# Patient Record
Sex: MALE | Race: WHITE | NOT HISPANIC OR LATINO | Employment: OTHER | URBAN - METROPOLITAN AREA
[De-identification: names, ages, dates, MRNs, and addresses within clinical notes are randomized per-mention and may not be internally consistent; named-entity substitution may affect disease eponyms.]

---

## 2022-03-09 ENCOUNTER — APPOINTMENT (OUTPATIENT)
Dept: CT IMAGING | Facility: HOSPITAL | Age: 62
End: 2022-03-09

## 2022-03-09 ENCOUNTER — HOSPITAL ENCOUNTER (INPATIENT)
Facility: HOSPITAL | Age: 62
LOS: 2 days | Discharge: HOME | End: 2022-03-11
Attending: EMERGENCY MEDICINE | Admitting: INTERNAL MEDICINE

## 2022-03-09 DIAGNOSIS — R19.00 ABDOMINAL MASS, UNSPECIFIED ABDOMINAL LOCATION: Primary | ICD-10-CM

## 2022-03-09 DIAGNOSIS — R26.2 DIFFICULTY WALKING: ICD-10-CM

## 2022-03-09 DIAGNOSIS — R19.09 ABDOMINAL MASS OF OTHER SITE: ICD-10-CM

## 2022-03-09 DIAGNOSIS — Z78.9 DECREASED ACTIVITIES OF DAILY LIVING (ADL): ICD-10-CM

## 2022-03-09 LAB
ALBUMIN SERPL-MCNC: 3.4 G/DL (ref 3.5–5.2)
ALBUMIN/GLOB SERPL: 1 G/DL
ALP SERPL-CCNC: 126 U/L (ref 39–117)
ALT SERPL W P-5'-P-CCNC: 23 U/L (ref 1–41)
AMMONIA BLD-SCNC: 15 UMOL/L (ref 16–60)
ANION GAP SERPL CALCULATED.3IONS-SCNC: 13.7 MMOL/L (ref 5–15)
AST SERPL-CCNC: 19 U/L (ref 1–40)
BASOPHILS # BLD AUTO: 0.02 10*3/MM3 (ref 0–0.2)
BASOPHILS NFR BLD AUTO: 0.1 % (ref 0–1.5)
BILIRUB SERPL-MCNC: 0.4 MG/DL (ref 0–1.2)
BILIRUB UR QL STRIP: NEGATIVE
BUN SERPL-MCNC: 26 MG/DL (ref 8–23)
BUN/CREAT SERPL: 26 (ref 7–25)
CALCIUM SPEC-SCNC: 9.8 MG/DL (ref 8.6–10.5)
CHLORIDE SERPL-SCNC: 91 MMOL/L (ref 98–107)
CLARITY UR: CLEAR
CO2 SERPL-SCNC: 28.3 MMOL/L (ref 22–29)
COLOR UR: YELLOW
CREAT SERPL-MCNC: 1 MG/DL (ref 0.76–1.27)
DEPRECATED RDW RBC AUTO: 45.1 FL (ref 37–54)
EGFRCR SERPLBLD CKD-EPI 2021: 85.1 ML/MIN/1.73
EOSINOPHIL # BLD AUTO: 0.01 10*3/MM3 (ref 0–0.4)
EOSINOPHIL NFR BLD AUTO: 0.1 % (ref 0.3–6.2)
ERYTHROCYTE [DISTWIDTH] IN BLOOD BY AUTOMATED COUNT: 14.6 % (ref 12.3–15.4)
GLOBULIN UR ELPH-MCNC: 3.5 GM/DL
GLUCOSE SERPL-MCNC: 161 MG/DL (ref 65–99)
GLUCOSE UR STRIP-MCNC: NEGATIVE MG/DL
HCT VFR BLD AUTO: 38.1 % (ref 37.5–51)
HGB BLD-MCNC: 12.6 G/DL (ref 13–17.7)
HGB UR QL STRIP.AUTO: NEGATIVE
HOLD SPECIMEN: NORMAL
HOLD SPECIMEN: NORMAL
IMM GRANULOCYTES # BLD AUTO: 0.08 10*3/MM3 (ref 0–0.05)
IMM GRANULOCYTES NFR BLD AUTO: 0.5 % (ref 0–0.5)
INR PPP: 1.01 (ref 2–3)
KETONES UR QL STRIP: NEGATIVE
LEUKOCYTE ESTERASE UR QL STRIP.AUTO: NEGATIVE
LIPASE SERPL-CCNC: 47 U/L (ref 13–60)
LYMPHOCYTES # BLD AUTO: 1.64 10*3/MM3 (ref 0.7–3.1)
LYMPHOCYTES NFR BLD AUTO: 9.3 % (ref 19.6–45.3)
MCH RBC QN AUTO: 28.7 PG (ref 26.6–33)
MCHC RBC AUTO-ENTMCNC: 33.1 G/DL (ref 31.5–35.7)
MCV RBC AUTO: 86.8 FL (ref 79–97)
MONOCYTES # BLD AUTO: 1.85 10*3/MM3 (ref 0.1–0.9)
MONOCYTES NFR BLD AUTO: 10.4 % (ref 5–12)
NEUTROPHILS NFR BLD AUTO: 14.12 10*3/MM3 (ref 1.7–7)
NEUTROPHILS NFR BLD AUTO: 79.6 % (ref 42.7–76)
NITRITE UR QL STRIP: NEGATIVE
NRBC BLD AUTO-RTO: 0 /100 WBC (ref 0–0.2)
PH UR STRIP.AUTO: 6 [PH] (ref 5–8)
PLATELET # BLD AUTO: 419 10*3/MM3 (ref 140–450)
PMV BLD AUTO: 9.8 FL (ref 6–12)
POTASSIUM SERPL-SCNC: 4.5 MMOL/L (ref 3.5–5.2)
PROT SERPL-MCNC: 6.9 G/DL (ref 6–8.5)
PROT UR QL STRIP: ABNORMAL
PROTHROMBIN TIME: 10.6 SECONDS (ref 9.4–12)
RBC # BLD AUTO: 4.39 10*6/MM3 (ref 4.14–5.8)
SODIUM SERPL-SCNC: 133 MMOL/L (ref 136–145)
SP GR UR STRIP: >1.03 (ref 1–1.03)
UROBILINOGEN UR QL STRIP: ABNORMAL
WBC NRBC COR # BLD: 17.72 10*3/MM3 (ref 3.4–10.8)
WHOLE BLOOD HOLD SPECIMEN: NORMAL

## 2022-03-09 PROCEDURE — 99284 EMERGENCY DEPT VISIT MOD MDM: CPT

## 2022-03-09 PROCEDURE — 82140 ASSAY OF AMMONIA: CPT | Performed by: INTERNAL MEDICINE

## 2022-03-09 PROCEDURE — 74177 CT ABD & PELVIS W/CONTRAST: CPT

## 2022-03-09 PROCEDURE — 85610 PROTHROMBIN TIME: CPT | Performed by: INTERNAL MEDICINE

## 2022-03-09 PROCEDURE — 80053 COMPREHEN METABOLIC PANEL: CPT | Performed by: EMERGENCY MEDICINE

## 2022-03-09 PROCEDURE — 25010000002 HYDROMORPHONE 1 MG/ML SOLUTION: Performed by: EMERGENCY MEDICINE

## 2022-03-09 PROCEDURE — 25010000002 HYDROMORPHONE 1 MG/ML SOLUTION: Performed by: INTERNAL MEDICINE

## 2022-03-09 PROCEDURE — 85025 COMPLETE CBC W/AUTO DIFF WBC: CPT | Performed by: EMERGENCY MEDICINE

## 2022-03-09 PROCEDURE — 83690 ASSAY OF LIPASE: CPT | Performed by: EMERGENCY MEDICINE

## 2022-03-09 PROCEDURE — 0 IOPAMIDOL PER 1 ML: Performed by: EMERGENCY MEDICINE

## 2022-03-09 PROCEDURE — 99223 1ST HOSP IP/OBS HIGH 75: CPT | Performed by: INTERNAL MEDICINE

## 2022-03-09 PROCEDURE — 81003 URINALYSIS AUTO W/O SCOPE: CPT | Performed by: EMERGENCY MEDICINE

## 2022-03-09 PROCEDURE — 82378 CARCINOEMBRYONIC ANTIGEN: CPT | Performed by: INTERNAL MEDICINE

## 2022-03-09 RX ORDER — ACETAMINOPHEN 500 MG
500-1000 TABLET ORAL EVERY 6 HOURS PRN
COMMUNITY
End: 2022-03-11 | Stop reason: HOSPADM

## 2022-03-09 RX ORDER — AMLODIPINE BESYLATE 10 MG/1
10 TABLET ORAL DAILY
COMMUNITY

## 2022-03-09 RX ORDER — PREGABALIN 150 MG/1
150 CAPSULE ORAL 2 TIMES DAILY
COMMUNITY

## 2022-03-09 RX ORDER — SODIUM CHLORIDE 0.9 % (FLUSH) 0.9 %
10 SYRINGE (ML) INJECTION AS NEEDED
Status: DISCONTINUED | OUTPATIENT
Start: 2022-03-09 | End: 2022-03-11 | Stop reason: HOSPADM

## 2022-03-09 RX ORDER — ATORVASTATIN CALCIUM 10 MG/1
10 TABLET, FILM COATED ORAL DAILY
COMMUNITY

## 2022-03-09 RX ORDER — PREDNISONE 20 MG/1
40 TABLET ORAL DAILY
COMMUNITY
Start: 2022-03-08 | End: 2022-03-11 | Stop reason: HOSPADM

## 2022-03-09 RX ORDER — LISINOPRIL 10 MG/1
10 TABLET ORAL DAILY
COMMUNITY
End: 2022-03-11 | Stop reason: HOSPADM

## 2022-03-09 RX ORDER — CEFUROXIME AXETIL 500 MG/1
500 TABLET ORAL 2 TIMES DAILY
COMMUNITY
Start: 2022-03-01 | End: 2022-03-11 | Stop reason: HOSPADM

## 2022-03-09 RX ADMIN — HYDROMORPHONE HYDROCHLORIDE 0.5 MG: 1 INJECTION, SOLUTION INTRAMUSCULAR; INTRAVENOUS; SUBCUTANEOUS at 12:50

## 2022-03-09 RX ADMIN — HYDROMORPHONE HYDROCHLORIDE 1 MG: 1 INJECTION, SOLUTION INTRAMUSCULAR; INTRAVENOUS; SUBCUTANEOUS at 10:30

## 2022-03-09 RX ADMIN — IOPAMIDOL 100 ML: 755 INJECTION, SOLUTION INTRAVENOUS at 10:11

## 2022-03-09 RX ADMIN — HYDROMORPHONE HYDROCHLORIDE 0.5 MG: 1 INJECTION, SOLUTION INTRAMUSCULAR; INTRAVENOUS; SUBCUTANEOUS at 22:23

## 2022-03-09 NOTE — ED PROVIDER NOTES
Time: 9:20 AM EST  Arrived by: ambulance  Chief Complaint: abdominal pain   History provided by: pt  History is limited by: N/A     History of Present Illness:    Ramesh Mccann is a 61 y.o. male who presents to the emergency department today with complaints of abdominal pain over the past four weeks. Pt states the pain is upper and shoot across his abdomen. He also goes on to complain of abdominal distention over the past few weeks as well as constipation. He notes he hasn't had a bowel movement in approximately a week. He denies fever, nausea, emesis, chest pain, dysuria, hematuria, or any other pertinent sx.      History provided by:  Patient   used: No    Abdominal Pain  Pain location:  RUQ  Pain radiates to:  Does not radiate  Pain severity:  Moderate  Onset quality:  Gradual  Duration:  4 weeks  Chronicity:  New  Relieved by:  Nothing  Associated symptoms: constipation    Associated symptoms: no chest pain, no chills, no cough, no diarrhea, no dysuria, no fever, no nausea, no shortness of breath, no sore throat and no vomiting    Constipation  Associated symptoms: abdominal pain    Associated symptoms: no diarrhea, no dysuria, no fever, no nausea and no vomiting        Patient Care Team  Primary Care Provider: Provider, No Known    Past Medical History:     Allergies   Allergen Reactions   • Penicillins Hives     Past Medical History:   Diagnosis Date   • COPD (chronic obstructive pulmonary disease) (HCC)    • Hypertension    • Stroke (HCC)      History reviewed. No pertinent surgical history.  History reviewed. No pertinent family history.    Home Medications:  Prior to Admission medications    Not on File        Social History:   Social History     Tobacco Use   • Smoking status: Current Every Day Smoker     Packs/day: 1.00     Types: Cigarettes   Substance Use Topics   • Alcohol use: Not Currently   • Drug use: Never     Recent travel: no     Review of Systems:  Review of Systems  "  Constitutional: Negative for chills and fever.   HENT: Negative for congestion, rhinorrhea and sore throat.    Eyes: Negative for pain and visual disturbance.   Respiratory: Negative for apnea, cough, chest tightness and shortness of breath.    Cardiovascular: Negative for chest pain and palpitations.   Gastrointestinal: Positive for abdominal distention, abdominal pain and constipation. Negative for diarrhea, nausea and vomiting.   Genitourinary: Negative for difficulty urinating and dysuria.   Musculoskeletal: Negative for joint swelling and myalgias.   Skin: Negative for color change.   Neurological: Negative for seizures and headaches.   Psychiatric/Behavioral: Negative.    All other systems reviewed and are negative.       Physical Exam:  /69   Pulse 87   Temp 97.9 °F (36.6 °C) (Oral)   Resp 20   Ht 182.9 cm (72\")   Wt 67.4 kg (148 lb 9.4 oz)   SpO2 92%   BMI 20.15 kg/m²     Physical Exam  Vitals and nursing note reviewed.   Constitutional:       General: He is not in acute distress.     Appearance: Normal appearance.   HENT:      Head: Normocephalic and atraumatic.      Nose: Nose normal.      Mouth/Throat:      Pharynx: Oropharynx is clear.   Eyes:      General: No scleral icterus.     Conjunctiva/sclera: Conjunctivae normal.   Cardiovascular:      Rate and Rhythm: Normal rate and regular rhythm.      Heart sounds: Normal heart sounds. No murmur heard.  Pulmonary:      Effort: No respiratory distress.      Breath sounds: Normal breath sounds. No wheezing, rhonchi or rales.   Chest:      Chest wall: No tenderness.   Abdominal:      General: There is distension.      Palpations: Abdomen is soft.      Tenderness: There is no abdominal tenderness. There is no guarding or rebound.      Comments: No rigidity.   Musculoskeletal:         General: No tenderness. Normal range of motion.      Cervical back: Normal range of motion and neck supple.      Right lower leg: No edema.      Left lower leg: No " edema.   Skin:     General: Skin is warm and dry.   Neurological:      Mental Status: He is alert. Mental status is at baseline.   Psychiatric:         Mood and Affect: Mood normal.         Behavior: Behavior normal.                Medications in the Emergency Department:  Medications   sodium chloride 0.9 % flush 10 mL (has no administration in time range)   HYDROmorphone (DILAUDID) injection 0.5 mg (0.5 mg Intravenous Given 3/9/22 1250)   HYDROmorphone (DILAUDID) injection 1 mg (1 mg Intravenous Given 3/9/22 1030)   iopamidol (ISOVUE-370) 76 % injection 100 mL (100 mL Intravenous Given 3/9/22 1011)        Labs  Lab Results (last 24 hours)     Procedure Component Value Units Date/Time    CBC & Differential [[id number]]  (Abnormal) Collected: 03/09/22 0917    Specimen: Blood Updated: 03/09/22 0933    Narrative:      The following orders were created for panel order CBC & Differential.  Procedure                               Abnormality         Status                     ---------                               -----------         ------                     CBC Auto Differential[[id number]]        Abnormal            Final result                 Please view results for these tests on the individual orders.    Comprehensive Metabolic Panel [[id number]]  (Abnormal) Collected: 03/09/22 0917    Specimen: Blood Updated: 03/09/22 0949     Glucose 161 mg/dL      BUN 26 mg/dL      Creatinine 1.00 mg/dL      Sodium 133 mmol/L      Potassium 4.5 mmol/L      Chloride 91 mmol/L      CO2 28.3 mmol/L      Calcium 9.8 mg/dL      Total Protein 6.9 g/dL      Albumin 3.40 g/dL      ALT (SGPT) 23 U/L      AST (SGOT) 19 U/L      Alkaline Phosphatase 126 U/L      Total Bilirubin 0.4 mg/dL      Globulin 3.5 gm/dL      A/G Ratio 1.0 g/dL      BUN/Creatinine Ratio 26.0     Anion Gap 13.7 mmol/L      eGFR 85.1 mL/min/1.73      Comment: National Kidney Foundation and American Society of Nephrology (ASN) Task Force recommended calculation based  on the Chronic Kidney Disease Epidemiology Collaboration (CKD-EPI) equation refit without adjustment for race.       Narrative:      GFR Normal >60  Chronic Kidney Disease <60  Kidney Failure <15      Lipase [[id number]]  (Normal) Collected: 03/09/22 0917    Specimen: Blood Updated: 03/09/22 0949     Lipase 47 U/L     CBC Auto Differential [[id number]]  (Abnormal) Collected: 03/09/22 0917    Specimen: Blood Updated: 03/09/22 0933     WBC 17.72 10*3/mm3      RBC 4.39 10*6/mm3      Hemoglobin 12.6 g/dL      Hematocrit 38.1 %      MCV 86.8 fL      MCH 28.7 pg      MCHC 33.1 g/dL      RDW 14.6 %      RDW-SD 45.1 fl      MPV 9.8 fL      Platelets 419 10*3/mm3      Neutrophil % 79.6 %      Lymphocyte % 9.3 %      Monocyte % 10.4 %      Eosinophil % 0.1 %      Basophil % 0.1 %      Immature Grans % 0.5 %      Neutrophils, Absolute 14.12 10*3/mm3      Lymphocytes, Absolute 1.64 10*3/mm3      Monocytes, Absolute 1.85 10*3/mm3      Eosinophils, Absolute 0.01 10*3/mm3      Basophils, Absolute 0.02 10*3/mm3      Immature Grans, Absolute 0.08 10*3/mm3      nRBC 0.0 /100 WBC     Urinalysis With Microscopic If Indicated (No Culture) - Urine, Clean Catch [[id number]]  (Abnormal) Collected: 03/09/22 1131    Specimen: Urine, Clean Catch Updated: 03/09/22 1140     Color, UA Yellow     Appearance, UA Clear     pH, UA 6.0     Specific Gravity, UA >1.030     Glucose, UA Negative     Ketones, UA Negative     Bilirubin, UA Negative     Blood, UA Negative     Protein, UA Trace     Leuk Esterase, UA Negative     Nitrite, UA Negative     Urobilinogen, UA 0.2 E.U./dL    Narrative:      Urine microscopic not indicated.           Imaging:  CT Abdomen Pelvis With Contrast    Result Date: 3/9/2022  PROCEDURE: CT ABDOMEN PELVIS W CONTRAST  COMPARISON: None  INDICATIONS: GENERALZIED ABDOMEN PAIN WITH SWELLING  TECHNIQUE: CT images were created with non-ionic intravenous contrast material.   PROTOCOL:   Standard imaging protocol performed    RADIATION:    DLP: 813.4mGy*cm   Automated exposure control was utilized to minimize radiation dose. CONTRAST: 100cc Isovue 370 I.V.  FINDINGS:  Sub cm nodule is seen in the posterior right middle lobe on the very 1st image of this study.  This is not adequately characterized.  CT scan of the chest is recommended.  Moderate hiatal hernia is evident.  The liver has a nodular contour suggesting cirrhosis.  Multiple hypodense hepatic masses are suspicious for metastatic disease.  Irregular peripheral enhancement is evident.  The nodular peripheral progressive enhancement typical of hemangioma is not evident.  The largest lesion is seen in the left liver lobe and measures 5 cm in diameter.  About 10 percent of the hepatic volume is involved.  The gallbladder is not abnormally distended.  Multi-cystic mass in the head of the pancreas measures 5 cm by 2.5 cm.  A moderate amount of ascites is evident.  The spleen is of normal size.  The left kidney has a normal appearance.  The right kidney is relatively small, suggesting chronic ischemia.  Moderately dilated loops of small bowel measure up to 3 cm in diameter, with prominent air-fluid levels.  Heterogeneous mass in the right lower quadrant involve small bowel, and measures 10 cm in diameter.  CONTINUED ON NEXT PAGE...    No abnormality of the colon is evident.  The urinary bladder is not abnormally distended.  Multiple old right rib fractures are evident.  Moderate degenerative changes seen in the lower thoracic and lumbar spine.        CT scan of the abdomen and pelvis with IV contrast demonstrating nodular hepatic contour suggesting cirrhosis.  Multiple hypodense hepatic masses with a regular peripheral enhancement are consistent with metastases.  5 cm multi cystic mass is seen in the head of the pancreas.  Moderately dilated loops of small bowel with prominent air-fluid levels may represent partial or incomplete small bowel obstruction.  10 cm heterogeneous mass in the right lower  quadrant involves small bowel.     NAVEEN MOHAN MD       Electronically Signed and Approved By: NAVEEN MOHAN MD on 3/09/2022 at 11:08               Procedures:  Procedures    Progress                            Medical Decision Making:  MDM  Number of Diagnoses or Management Options  Abdominal mass, unspecified abdominal location  Diagnosis management comments: White blood cell count of 17,000.  The patient´s CMP was reviewed and shows no abnormalities of critical concern. Of note, the patient´s sodium and potassium are acceptable. The patient´s liver enzymes are unremarkable. The patient´s renal function (creatinine) is preserved. The patient has a normal anion gap.  Urinalysis is negative for bacteriuria.  CT scan is consistent with a pancreatic mass with mets to the liver.  Case was discussed with Dr. Liu who agrees with admission.       Amount and/or Complexity of Data Reviewed  Clinical lab tests: reviewed  Tests in the radiology section of CPT®: reviewed  Discussion of test results with the performing providers: yes  Discuss the patient with other providers: yes  Independent visualization of images, tracings, or specimens: yes    Risk of Complications, Morbidity, and/or Mortality  Presenting problems: moderate  Management options: moderate    Patient Progress  Patient progress: stable       Final diagnoses:   Abdominal mass, unspecified abdominal location        Disposition:  ED Disposition     ED Disposition   Decision to Admit    Condition   --    Comment   Level of Care: Med/Surg [1]   Diagnosis: Abdominal mass [[id number]]   Admitting Physician: JEN LIU [[id number]]   Attending Physician: JEN LIU [[id number]]   Certification: I Certify That Inpatient Hospital Services Are Medically Necessary For Greater Than 2 Midnights               This medical record created using voice recognition software and may contain unintended errors.             Crystal Jose  03/09/22 0947       Janet  MD Ashley  03/09/22 4943

## 2022-03-09 NOTE — PAYOR COMM NOTE
"Ramesh Stephens (61 y.o. Male)             Date of Birth   1960    Social Security Number       Address   [address] [place] [address]    Home Phone   [phone]    MRN   [id number]       Presybeterian   None    Marital Status                               Admission Date   3/9/22    Admission Type   Emergency    Admitting Provider   Rick Patel MD    Attending Provider   Rick Patel MD    Department, Room/Bed   [facility] 5TH FLOOR SURGICAL TELEMETRY UNIT, 503/1       Discharge Date       Discharge Disposition       Discharge Destination                               Attending Provider: Rick Patel MD    Allergies: Penicillins    Isolation: None   Infection: None   Code Status: CPR   Advance Care Planning Activity    Ht: 182.9 cm (72\")   Wt: 67.4 kg (148 lb 9.4 oz)    Admission Cmt: None   Principal Problem: None                Active Insurance as of 3/9/2022     Primary Coverage     Payor Plan Insurance Group Employer/Plan Group    [facility] COMMUNITY PLAN [facility] COMMUNITY PLAN [facility]     Payor Plan Address Payor Plan Phone Number Payor Plan Fax Number Effective Dates    [address]   1/1/2022 - None Entered    [facility] [id number]       Subscriber Name Subscriber Birth Date Member ID       RAMESH STEPHENS 1960 [id number]                 Emergency Contacts      (Rel.) Home Phone Work Phone Mobile Phone    Carmen Lucia (Sister) -- -- [phone]                Gastroenterology GRG - Clinical Indications for Admission to Inpatient Care by Ammy Story         Met: Reviewed on 3/9/2022 by Ammy Story       Created Using Review Status Review Entered   [employer] Completed 3/9/2022 17:32       Criteria Set Name - Subset   Gastroenterology GRG - Clinical Indications for Admission to Inpatient Care      Criteria Review      Clinical Indications for Admission to Inpatient Care    Most Recent : Ammy Story Most Recent Date: 3/9/2022 " 17:32:39 EST    (X) Hospital admission is needed for appropriate care of the patient because of  1 or more  of    the following :       (X) Suspected acute intra-abdominal process, as indicated by  1 or more  of the following  (1)       (2) (3) (4) (5):          (X) Other signs or symptoms of acute abdominal disease (eg, severe pain, free air) (11)          3/9/2022 17:24:52 EST by Ammy Story            Pt states the pain is upper and shoot across his abdomen. He also goes on to complain of abdominal distention over the past few weeks as well as constipation. He notes he hasn't had a bowel movement in approximately a week.    Notes:    3/9/2022 17:32:39 EST by Ammy Story    Subject: Admission    FINDINGS:    Sub cm nodule is seen in the posterior right middle lobe on the very 1st image of this study.  This    is not adequately characterized.  CT scan of the chest is recommended.  Moderate hiatal hernia is    evident.         The liver has a nodular contour suggesting cirrhosis.  Multiple hypodense hepatic masses are    suspicious for metastatic disease.  Irregular peripheral enhancement is evident.  The nodular    peripheral progressive enhancement typical of hemangioma is not evident.  The largest lesion is    seen in the left liver lobe and measures 5 cm in diameter.  About 10 percent of the hepatic volume    is involved.         The gallbladder is not abnormally distended.         Multi-cystic mass in the head of the pancreas measures 5 cm by 2.5 cm.         A moderate amount of ascites is evident.         The spleen is of normal size.         The left kidney has a normal appearance.  The right kidney is relatively small, suggesting chronic    ischemia.         Moderately dilated loops of small bowel measure up to 3 cm in diameter, with prominent air-fluid    levels.  Heterogeneous mass in the right lower quadrant involve small bowel, and measures 10 cm in    diameter.             3/9/2022 17:24:52  EST by Ammy Story    Subject: Admission    1 month history of worsening weakness, worsening appetite, inability to walk, abdominal pain.  In the emergency department the patient underwent CT scan of the abdomen which was significant for multiple hepatic masses, pancreatic mass, some small bowel distention, moderate ascites.  Because of the inability to walk and no safe discharge plan the patient was admitted to the hospital for further evaluation and management.              Assessment:    Weakness    Abdominal pain    Metastatic disease throughout the abdomen, possible pancreatic in nature    History of hypertension    History of hyperlipidemia    History of alcoholism,    COPD not in acute exacerbation    Nicotine dependence         Plan:    Patient mid to the hospital for further work-up and management of above    PT OT consulted    Routine labs reviewed    CT scan reviewed, consistent with metastatic disease    Check CEA and CA 19-9    Will need oncology consultation    Resume appropriate home medications once reconciled    Check INR    Plan for paracentesis in a.m.    Encourage oral intake             Mace: ROSA [id number] Tax ID [id number]  Problem List           Codes Noted - Resolved       Hospital    Abdominal mass ICD-10-CM: R19.00  ICD-9-CM: 789.30 3/9/2022 - Present             History & Physical      Rick Patel MD at [address] [place], [place] [address]IST HISTORY AND PHYSICAL  Date: 3/9/2022   Patient Name: Ramesh Mccann  : 1960  MRN: [id number]  Primary Care Physician:  Provider, No Known  Date of admission: 3/9/2022    Subjective   Subjective     Chief Complaint: Weakness    HPI:  Ramesh Mccann is a 61 y.o. male who presents to the hospital with a 1 month history of worsening weakness, worsening appetite.  Patient states that his sister brought him up from [place] to help care for him as he is progressively getting worse.  They brought the patient to the emergency  department today for worsening weakness, inability to walk, abdominal pain.  In the emergency department the patient underwent CT scan of the abdomen which was significant for multiple hepatic masses, pancreatic mass, some small bowel distention, moderate ascites.  Because of the inability to walk and no safe discharge plan the patient was admitted to the hospital for further evaluation and management.    Personal History     Past Medical History:  Past Medical History:   Diagnosis Date   • COPD (chronic obstructive pulmonary disease) (HCC)    • Hypertension    • Stroke (HCC)        Past Surgical History:  Denies previous surgeries    Family History:   Hypertension    Social History:    reports that he has been smoking cigarettes. He has been smoking about 1.00 pack per day. He does not have any smokeless tobacco history on file. He reports previous alcohol use. He reports that he does not use drugs.    Home Medications:       Allergies:  Allergies   Allergen Reactions   • Penicillins Hives       Review of Systems:  14 point review of systems negative other than stated in HPI    Objective   Objective     Vitals:   Temp:  [97.9 °F (36.6 °C)] 97.9 °F (36.6 °C)  Heart Rate:  [] 87  Resp:  [14-20] 20  BP: (104-125)/(66-85) 111/69    Physical Exam    Constitutional: Awake, alert, no acute distress   Eyes: Pupils equal, sclerae anicteric, no conjunctival injection   HENT: NCAT, mucous membranes moist   Neck: Supple, no thyromegaly, no lymphadenopathy, trachea midline   Respiratory: Clear to auscultation bilaterally, nonlabored respirations    Cardiovascular: RRR, no murmurs, rubs, or gallops   Gastrointestinal: Positive bowel sounds, distended, diffusely tender   Musculoskeletal: No bilateral ankle edema, no clubbing or cyanosis to extremities   Psychiatric: Appropriate affect, cooperative   Neurologic: Oriented x 3, strength symmetric in all extremities, Cranial Nerves grossly intact to confrontation, speech  clear   Skin: No rashes     Result Review:  I have personally reviewed the results from the time of this admission to 3/9/2022 12:36 EST and agree with these findings:  [x]  Laboratory  CMP    CMP 3/9/22   Glucose 161 (A)   BUN 26 (A)   Creatinine 1.00   Sodium 133 (A)   Potassium 4.5   Chloride 91 (A)   Calcium 9.8   Albumin 3.40 (A)   Total Bilirubin 0.4   Alkaline Phosphatase 126 (A)   AST (SGOT) 19   ALT (SGPT) 23   (A) Abnormal value            CBC    CBC 3/9/22   WBC 17.72 (A)   RBC 4.39   Hemoglobin 12.6 (A)   Hematocrit 38.1   MCV 86.8   MCH 28.7   MCHC 33.1   RDW 14.6   Platelets 419   (A) Abnormal value            []  Microbiology  []  Radiology  []  EKG/Telemetry   []  Cardiology/Vascular   []  Pathology  []  Old records  []  Other:      Assessment/Plan   Assessment / Plan     Assessment:   Weakness  Abdominal pain  Metastatic disease throughout the abdomen, 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  Encourage oral intake    DVT prophylaxis: SCDs  GI: Pepcid  Diet: Regular  Telemetry: Reviewed, sinus rhythm    Disposition: Patient unsafe for discharge home as patient's family member unable to take care of him given his severe weakness and size    Reviewed patients labs and imaging, and discussed with patient and nurse at bedside, discussed with Dr. Llamas    CODE STATUS:    Code Status (Patient has no pulse and is not breathing): CPR (Attempt to Resuscitate)  Medical Interventions (Patient has pulse or is breathing): Full Support      Admission Status:  I believe this  patient meets inpatient status.      Electronically signed by Rick Patel MD, 03/09/22, 12:36 PM EST.               Electronically signed by Rick Patel MD at 03/09/22 1656          Emergency Department Notes      Ashley Gonzales MD at 03/09/22 0920          Time: 9:20 AM EST  Arrived by: ambulance  Chief Complaint: abdominal pain   History provided by: pt  History is limited by: N/A     History of Present Illness:    Ramesh Mccann is a 61 y.o. male who presents to the emergency department today with complaints of abdominal pain over the past four weeks. Pt states the pain is upper and shoot across his abdomen. He also goes on to complain of abdominal distention over the past few weeks as well as constipation. He notes he hasn't had a bowel movement in approximately a week. He denies fever, nausea, emesis, chest pain, dysuria, hematuria, or any other pertinent sx.      History provided by:  Patient   used: No    Abdominal Pain  Pain location:  RUQ  Pain radiates to:  Does not radiate  Pain severity:  Moderate  Onset quality:  Gradual  Duration:  4 weeks  Chronicity:  New  Relieved by:  Nothing  Associated symptoms: constipation    Associated symptoms: no chest pain, no chills, no cough, no diarrhea, no dysuria, no fever, no nausea, no shortness of breath, no sore throat and no vomiting    Constipation  Associated symptoms: abdominal pain    Associated symptoms: no diarrhea, no dysuria, no fever, no nausea and no vomiting        Patient Care Team  Primary Care Provider: Provider, No Known    Past Medical History:     Allergies   Allergen Reactions   • Penicillins Hives     Past Medical History:   Diagnosis Date   • COPD (chronic obstructive pulmonary disease) (HCC)    • Hypertension    • Stroke (HCC)      History reviewed. No pertinent surgical history.  History reviewed. No pertinent family history.    Home Medications:  Prior to Admission medications    Not on File        Social  "History:   Social History     Tobacco Use   • Smoking status: Current Every Day Smoker     Packs/day: 1.00     Types: Cigarettes   Substance Use Topics   • Alcohol use: Not Currently   • Drug use: Never     Recent travel: no     Review of Systems:  Review of Systems   Constitutional: Negative for chills and fever.   HENT: Negative for congestion, rhinorrhea and sore throat.    Eyes: Negative for pain and visual disturbance.   Respiratory: Negative for apnea, cough, chest tightness and shortness of breath.    Cardiovascular: Negative for chest pain and palpitations.   Gastrointestinal: Positive for abdominal distention, abdominal pain and constipation. Negative for diarrhea, nausea and vomiting.   Genitourinary: Negative for difficulty urinating and dysuria.   Musculoskeletal: Negative for joint swelling and myalgias.   Skin: Negative for color change.   Neurological: Negative for seizures and headaches.   Psychiatric/Behavioral: Negative.    All other systems reviewed and are negative.       Physical Exam:  /69   Pulse 87   Temp 97.9 °F (36.6 °C) (Oral)   Resp 20   Ht 182.9 cm (72\")   Wt 67.4 kg (148 lb 9.4 oz)   SpO2 92%   BMI 20.15 kg/m²     Physical Exam  Vitals and nursing note reviewed.   Constitutional:       General: He is not in acute distress.     Appearance: Normal appearance.   HENT:      Head: Normocephalic and atraumatic.      Nose: Nose normal.      Mouth/Throat:      Pharynx: Oropharynx is clear.   Eyes:      General: No scleral icterus.     Conjunctiva/sclera: Conjunctivae normal.   Cardiovascular:      Rate and Rhythm: Normal rate and regular rhythm.      Heart sounds: Normal heart sounds. No murmur heard.  Pulmonary:      Effort: No respiratory distress.      Breath sounds: Normal breath sounds. No wheezing, rhonchi or rales.   Chest:      Chest wall: No tenderness.   Abdominal:      General: There is distension.      Palpations: Abdomen is soft.      Tenderness: There is no abdominal " tenderness. There is no guarding or rebound.      Comments: No rigidity.   Musculoskeletal:         General: No tenderness. Normal range of motion.      Cervical back: Normal range of motion and neck supple.      Right lower leg: No edema.      Left lower leg: No edema.   Skin:     General: Skin is warm and dry.   Neurological:      Mental Status: He is alert. Mental status is at baseline.   Psychiatric:         Mood and Affect: Mood normal.         Behavior: Behavior normal.                Medications in the Emergency Department:  Medications   sodium chloride 0.9 % flush 10 mL (has no administration in time range)   HYDROmorphone (DILAUDID) injection 0.5 mg (0.5 mg Intravenous Given 3/9/22 1250)   HYDROmorphone (DILAUDID) injection 1 mg (1 mg Intravenous Given 3/9/22 1030)   iopamidol (ISOVUE-370) 76 % injection 100 mL (100 mL Intravenous Given 3/9/22 1011)        Labs  Lab Results (last 24 hours)     Procedure Component Value Units Date/Time    CBC & Differential [[id number]]  (Abnormal) Collected: 03/09/22 0917    Specimen: Blood Updated: 03/09/22 0933    Narrative:      The following orders were created for panel order CBC & Differential.  Procedure                               Abnormality         Status                     ---------                               -----------         ------                     CBC Auto Differential[[id number]]        Abnormal            Final result                 Please view results for these tests on the individual orders.    Comprehensive Metabolic Panel [[id number]]  (Abnormal) Collected: 03/09/22 0917    Specimen: Blood Updated: 03/09/22 0949     Glucose 161 mg/dL      BUN 26 mg/dL      Creatinine 1.00 mg/dL      Sodium 133 mmol/L      Potassium 4.5 mmol/L      Chloride 91 mmol/L      CO2 28.3 mmol/L      Calcium 9.8 mg/dL      Total Protein 6.9 g/dL      Albumin 3.40 g/dL      ALT (SGPT) 23 U/L      AST (SGOT) 19 U/L      Alkaline Phosphatase 126 U/L      Total Bilirubin  0.4 mg/dL      Globulin 3.5 gm/dL      A/G Ratio 1.0 g/dL      BUN/Creatinine Ratio 26.0     Anion Gap 13.7 mmol/L      eGFR 85.1 mL/min/1.73      Comment: National Kidney Foundation and American Society of Nephrology (ASN) Task Force recommended calculation based on the Chronic Kidney Disease Epidemiology Collaboration (CKD-EPI) equation refit without adjustment for race.       Narrative:      GFR Normal >60  Chronic Kidney Disease <60  Kidney Failure <15      Lipase [[id number]]  (Normal) Collected: 03/09/22 0917    Specimen: Blood Updated: 03/09/22 0949     Lipase 47 U/L     CBC Auto Differential [[id number]]  (Abnormal) Collected: 03/09/22 0917    Specimen: Blood Updated: 03/09/22 0933     WBC 17.72 10*3/mm3      RBC 4.39 10*6/mm3      Hemoglobin 12.6 g/dL      Hematocrit 38.1 %      MCV 86.8 fL      MCH 28.7 pg      MCHC 33.1 g/dL      RDW 14.6 %      RDW-SD 45.1 fl      MPV 9.8 fL      Platelets 419 10*3/mm3      Neutrophil % 79.6 %      Lymphocyte % 9.3 %      Monocyte % 10.4 %      Eosinophil % 0.1 %      Basophil % 0.1 %      Immature Grans % 0.5 %      Neutrophils, Absolute 14.12 10*3/mm3      Lymphocytes, Absolute 1.64 10*3/mm3      Monocytes, Absolute 1.85 10*3/mm3      Eosinophils, Absolute 0.01 10*3/mm3      Basophils, Absolute 0.02 10*3/mm3      Immature Grans, Absolute 0.08 10*3/mm3      nRBC 0.0 /100 WBC     Urinalysis With Microscopic If Indicated (No Culture) - Urine, Clean Catch [[id number]]  (Abnormal) Collected: 03/09/22 1131    Specimen: Urine, Clean Catch Updated: 03/09/22 1140     Color, UA Yellow     Appearance, UA Clear     pH, UA 6.0     Specific Gravity, UA >1.030     Glucose, UA Negative     Ketones, UA Negative     Bilirubin, UA Negative     Blood, UA Negative     Protein, UA Trace     Leuk Esterase, UA Negative     Nitrite, UA Negative     Urobilinogen, UA 0.2 E.U./dL    Narrative:      Urine microscopic not indicated.           Imaging:  CT Abdomen Pelvis With Contrast    Result Date:  3/9/2022  PROCEDURE: CT ABDOMEN PELVIS W CONTRAST  COMPARISON: None  INDICATIONS: GENERALZIED ABDOMEN PAIN WITH SWELLING  TECHNIQUE: CT images were created with non-ionic intravenous contrast material.   PROTOCOL:   Standard imaging protocol performed    RADIATION:   DLP: 813.4mGy*cm   Automated exposure control was utilized to minimize radiation dose. CONTRAST: 100cc Isovue 370 I.V.  FINDINGS:  Sub cm nodule is seen in the posterior right middle lobe on the very 1st image of this study.  This is not adequately characterized.  CT scan of the chest is recommended.  Moderate hiatal hernia is evident.  The liver has a nodular contour suggesting cirrhosis.  Multiple hypodense hepatic masses are suspicious for metastatic disease.  Irregular peripheral enhancement is evident.  The nodular peripheral progressive enhancement typical of hemangioma is not evident.  The largest lesion is seen in the left liver lobe and measures 5 cm in diameter.  About 10 percent of the hepatic volume is involved.  The gallbladder is not abnormally distended.  Multi-cystic mass in the head of the pancreas measures 5 cm by 2.5 cm.  A moderate amount of ascites is evident.  The spleen is of normal size.  The left kidney has a normal appearance.  The right kidney is relatively small, suggesting chronic ischemia.  Moderately dilated loops of small bowel measure up to 3 cm in diameter, with prominent air-fluid levels.  Heterogeneous mass in the right lower quadrant involve small bowel, and measures 10 cm in diameter.  CONTINUED ON NEXT PAGE...    No abnormality of the colon is evident.  The urinary bladder is not abnormally distended.  Multiple old right rib fractures are evident.  Moderate degenerative changes seen in the lower thoracic and lumbar spine.        CT scan of the abdomen and pelvis with IV contrast demonstrating nodular hepatic contour suggesting cirrhosis.  Multiple hypodense hepatic masses with a regular peripheral enhancement are  consistent with metastases.  5 cm multi cystic mass is seen in the head of the pancreas.  Moderately dilated loops of small bowel with prominent air-fluid levels may represent partial or incomplete small bowel obstruction.  10 cm heterogeneous mass in the right lower quadrant involves small bowel.     NAVEEN MOHAN MD       Electronically Signed and Approved By: NAVEEN MOHAN MD on 3/09/2022 at 11:08               Procedures:  Procedures    Progress                            Medical Decision Making:  MDM  Number of Diagnoses or Management Options  Abdominal mass, unspecified abdominal location  Diagnosis management comments: White blood cell count of 17,000.  The patient´s CMP was reviewed and shows no abnormalities of critical concern. Of note, the patient´s sodium and potassium are acceptable. The patient´s liver enzymes are unremarkable. The patient´s renal function (creatinine) is preserved. The patient has a normal anion gap.  Urinalysis is negative for bacteriuria.  CT scan is consistent with a pancreatic mass with mets to the liver.  Case was discussed with Dr. Liu who agrees with admission.       Amount and/or Complexity of Data Reviewed  Clinical lab tests: reviewed  Tests in the radiology section of CPT®: reviewed  Discussion of test results with the performing providers: yes  Discuss the patient with other providers: yes  Independent visualization of images, tracings, or specimens: yes    Risk of Complications, Morbidity, and/or Mortality  Presenting problems: moderate  Management options: moderate    Patient Progress  Patient progress: stable       Final diagnoses:   Abdominal mass, unspecified abdominal location        Disposition:  ED Disposition     ED Disposition   Decision to Admit    Condition   --    Comment   Level of Care: Med/Surg [1]   Diagnosis: Abdominal mass [[id number]]   Admitting Physician: JEN LIU [[id number]]   Attending Physician: JEN LIU [[id number]]   Certification: I  Certify That Inpatient Hospital Services Are Medically Necessary For Greater Than 2 Midnights               This medical record created using voice recognition software and may contain unintended errors.             Crystal Jose  03/09/22 0947       Ashley Gonzales MD  03/09/22 1310      Electronically signed by Ashlye Gonzales MD at 03/09/22 1310       Vital Signs (last day)     Date/Time Temp Temp src Pulse Resp BP Patient Position SpO2    03/09/22 1704 97.52 (36.4) Oral 87 18 120/71 Lying 92    03/09/22 1557 97.16 (36.2) Oral 95 18 108/73 Lying 91    03/09/22 1500 97.6 (36.4) Oral 91 18 113/70 Lying 90    03/09/22 1445 -- -- 90 18 112/66 -- 91    03/09/22 1400 -- -- 88 18 115/71 -- 85    03/09/22 1300 -- -- 92 16 108/65 -- 89    03/09/22 1230 -- -- 87 20 111/69 -- 92    03/09/22 1145 -- -- 91 -- 112/66 -- 92    03/09/22 1130 -- -- 92 14 104/74 -- 92    03/09/22 1000 -- -- 104 18 122/85 -- 91    03/09/22 0945 -- -- 105 18 125/74 -- 93    03/09/22 0930 -- -- 102 18 111/72 -- 93    03/09/22 09:11:31 97.9 (36.6) Oral 102 16 122/72 Lying 94            Facility-Administered Medications as of 3/9/2022   Medication Dose Route Frequency Provider Last Rate Last Admin   • HYDROmorphone (DILAUDID) injection 0.5 mg  0.5 mg Intravenous Q2H PRN Rick Patel MD   0.5 mg at 03/09/22 1250   • [COMPLETED] HYDROmorphone (DILAUDID) injection 1 mg  1 mg Intravenous Once Ashley Gonzales MD   1 mg at 03/09/22 1030   • [COMPLETED] iopamidol (ISOVUE-370) 76 % injection 100 mL  100 mL Intravenous Once in imaging Ashley Gonzales MD   100 mL at 03/09/22 1011   • sodium chloride 0.9 % flush 10 mL  10 mL Intravenous PRN Ashley Gonzales MD         Orders (last 24 hrs)      Start     Ordered    03/09/22 1657  Place Sequential Compression Device  Once         03/09/22 1656    03/09/22 1657  Maintain Sequential Compression Device  Continuous         03/09/22 1656    03/09/22 1656  Ammonia  Once         03/09/22 1655     03/09/22 1648  CT Guided Paracentesis  1 Time Imaging         03/09/22 1655    03/09/22 1645  Protime-INR  Once         03/09/22 1655    03/09/22 1245  Diet Clear Liquid  Diet Effective Now         03/09/22 1244    03/09/22 1243  HYDROmorphone (DILAUDID) injection 0.5 mg  Every 2 Hours PRN         03/09/22 1243    03/09/22 1230  Code Status and Medical Interventions:  Continuous         03/09/22 1234    03/09/22 1229  Inpatient Admission  Once         03/09/22 1234    03/09/22 1154  Inpatient Hospitalist Consult  Once        Specialty:  Hospitalist  Provider:  Rick Patel MD    03/09/22 1153    03/09/22 1015  iopamidol (ISOVUE-370) 76 % injection 100 mL  Once in Imaging         03/09/22 1011    03/09/22 1000  HYDROmorphone (DILAUDID) injection 1 mg  Once         03/09/22 0947    03/09/22 0948  CT Abdomen Pelvis With Contrast  1 Time Imaging         03/09/22 0947    03/09/22 0911  NPO Diet  Diet Effective Now,   Status:  Canceled         03/09/22 0910    03/09/22 0911  Undress and Gown  Once         03/09/22 0910    03/09/22 0911  Insert peripheral IV  Once         03/09/22 0910    03/09/22 0911  [place] Draw  Once         03/09/22 0910    03/09/22 0911  CBC & Differential  Once         03/09/22 0910    03/09/22 0911  Comprehensive Metabolic Panel  Once         03/09/22 0910    03/09/22 0911  Lipase  Once         03/09/22 0910    03/09/22 0911  Urinalysis With Microscopic If Indicated (No Culture) - Urine, Clean Catch  Once         03/09/22 0910    03/09/22 0911  Green Top (Gel)  PROCEDURE ONCE         03/09/22 0911    03/09/22 0911  Lavender Top  PROCEDURE ONCE         03/09/22 0911    03/09/22 0911  Gold Top - SST  PROCEDURE ONCE         03/09/22 0911    03/09/22 0911  Light Blue Top  PROCEDURE ONCE         03/09/22 0911    03/09/22 0911  CBC Auto Differential  PROCEDURE ONCE         03/09/22 0911    03/09/22 0910  sodium chloride 0.9 % flush 10 mL  As Needed         03/09/22 0910    03/08/22 0000   "predniSONE (DELTASONE) 20 MG tablet  Daily         03/09/22 1242    03/01/22 0000  cefuroxime (CEFTIN) 500 MG tablet  2 Times Daily         03/09/22 1242    Signed and Held  Vital Signs  Per Hospital Policy         Signed and Held    Signed and Held  Notify Provider (With Default Parameters)  Until Discontinued         Signed and Held    Signed and Held  Intake & Output  Every Shift       Signed and Held    Signed and Held  Weigh Patient  Once         Signed and Held    Signed and Held  Daily Weights  Daily       Signed and Held    Signed and Held  Oxygen Therapy- Nasal Cannula; Titrate for SPO2: 90% - 95%  Continuous         Signed and Held    Signed and Held  CBC Auto Differential  Morning Draw         Signed and Held    Signed and Held  Comprehensive Metabolic Panel  Morning Draw         Signed and Held    Signed and Held  Magnesium  Morning Draw         Signed and Held    Signed and Held  Phosphorus  Morning Draw         Signed and Held    Signed and Held  Insert Peripheral IV  Once         Signed and Held    Signed and Held  Saline Lock & Maintain IV Access  Continuous         Signed and Held    Signed and Held  sodium chloride 0.9 % flush 10 mL  Every 12 Hours Scheduled         Signed and Held    Signed and Held  sodium chloride 0.9 % flush 10 mL  As Needed         Signed and Held    Signed and Held  famotidine (PEPCID) tablet 40 mg  Daily         Signed and Held    Signed and Held  sennosides-docusate (PERICOLACE) 8.6-50 MG per tablet 2 tablet  2 Times Daily        \"And\" Linked Group Details    Signed and Held    Signed and Held  polyethylene glycol (MIRALAX) packet 17 g  Daily PRN        \"And\" Linked Group Details    Signed and Held    Signed and Held  bisacodyl (DULCOLAX) EC tablet 5 mg  Daily PRN        \"And\" Linked Group Details    Signed and Held    Signed and Held  bisacodyl (DULCOLAX) suppository 10 mg  Daily PRN        \"And\" Linked Group Details    Signed and Held    Signed and Held  ondansetron " (ZOFRAN) injection 4 mg  Every 6 Hours PRN         Signed and Held    Signed and Held  multivitamin with minerals 1 tablet  Daily         Signed and Held    Signed and Held  melatonin tablet 5 mg  Nightly PRN         Signed and Held    Signed and Held  Place Sequential Compression Device  Once         Signed and Held    Signed and Held  Maintain Sequential Compression Device  Continuous         Signed and Held    Signed and Held  CEA  Once         Signed and Held    Signed and Held  Cancer Antigen 19-9  Once         Signed and Held    Signed and Held  HYDROmorphone (DILAUDID) injection 0.5 mg  Every 2 Hours PRN         Signed and Held    Signed and Held  PT Consult: Eval & Treat Functional Mobility Below Baseline  Once         Signed and Held    Signed and Held  OT Consult: Eval & Treat  Once         Signed and Held    --  pregabalin (Lyrica) 150 MG capsule  2 Times Daily         03/09/22 1242    --  atorvastatin (LIPITOR) 10 MG tablet  Daily         03/09/22 1242    --  amLODIPine (NORVASC) 10 MG tablet  Daily         03/09/22 1242    --  lisinopril (PRINIVIL,ZESTRIL) 10 MG tablet  Daily         03/09/22 1242    --  Multiple Vitamins-Minerals (MULTIVITAMIN GUMMIES MENS PO)  Daily         03/09/22 1242    --  acetaminophen (TYLENOL) 500 MG tablet  Every 6 Hours PRN         03/09/22 1242    Signed and Held  Body Fluid Cell Count With Differential - Body Fluid, Peritoneum  STAT         Signed and Held    Signed and Held  Albumin, Fluid - Body Fluid, Peritoneum  STAT         Signed and Held    Signed and Held  Bilirubin, Body Fluid - Body Fluid, Peritoneum  STAT         Signed and Held    Signed and Held  Amylase, Body Fluid - Body Fluid, Peritoneum  STAT         Signed and Held    Signed and Held  Glucose, Body Fluid - Body Fluid, Peritoneum  STAT         Signed and Held    Signed and Held  Lactate Dehydrogenase, Body Fluid - Body Fluid, Peritoneum  STAT         Signed and Held    Signed and Held  Non-gynecologic  Cytology  STAT         Signed and Held    Signed and Held  Protein, Body Fluid - Body Fluid, Peritoneum  STAT         Signed and Held    Signed and Held  Body Fluid Culture - Body Fluid, Peritoneum  STAT         Signed and Held    Signed and Held  Anaerobic Culture - Body Fluid, Peritoneum  STAT         Signed and Held    Signed and Held  Obtain Informed Consent  Once         Signed and Held                Physician Progress Notes (last 24 hours)  Notes from 03/08/22 1747 through 03/09/22 1747   No notes of this type exist for this encounter.         Consult Notes (last 24 hours)  Notes from 03/08/22 1747 through 03/09/22 1747   No notes of this type exist for this encounter.

## 2022-03-09 NOTE — H&P
[facility]  Date: 3/9/2022   Patient Name: Ramesh Mccann  : 1960  MRN: [id number]  Primary Care Physician:  Vee, No Known  Date of admission: 3/9/2022    Subjective   Subjective     Chief Complaint: Weakness    HPI:  Ramesh Mccann is a 61 y.o. male who presents to the hospital with a 1 month history of worsening weakness, worsening appetite.  Patient states that his sister brought him up from [place] to help care for him as he is progressively getting worse.  They brought the patient to the emergency department today for worsening weakness, inability to walk, abdominal pain.  In the emergency department the patient underwent CT scan of the abdomen which was significant for multiple hepatic masses, pancreatic mass, some small bowel distention, moderate ascites.  Because of the inability to walk and no safe discharge plan the patient was admitted to the hospital for further evaluation and management.    Personal History     Past Medical History:  Past Medical History:   Diagnosis Date   • COPD (chronic obstructive pulmonary disease) (HCC)    • Hypertension    • Stroke (HCC)        Past Surgical History:  Denies previous surgeries    Family History:   Hypertension    Social History:    reports that he has been smoking cigarettes. He has been smoking about 1.00 pack per day. He does not have any smokeless tobacco history on file. He reports previous alcohol use. He reports that he does not use drugs.    Home Medications:       Allergies:  Allergies   Allergen Reactions   • Penicillins Hives       Review of Systems:  14 point review of systems negative other than stated in HPI    Objective   Objective     Vitals:   Temp:  [97.9 °F (36.6 °C)] 97.9 °F (36.6 °C)  Heart Rate:  [] 87  Resp:  [14-20] 20  BP: (104-125)/(66-85) 111/69    Physical Exam    Constitutional: Awake, alert, no acute distress   Eyes: Pupils equal, sclerae anicteric, no conjunctival  injection   HENT: NCAT, mucous membranes moist   Neck: Supple, no thyromegaly, no lymphadenopathy, trachea midline   Respiratory: Clear to auscultation bilaterally, nonlabored respirations    Cardiovascular: RRR, no murmurs, rubs, or gallops   Gastrointestinal: Positive bowel sounds, distended, diffusely tender   Musculoskeletal: No bilateral ankle edema, no clubbing or cyanosis to extremities   Psychiatric: Appropriate affect, cooperative   Neurologic: Oriented x 3, strength symmetric in all extremities, Cranial Nerves grossly intact to confrontation, speech clear   Skin: No rashes     Result Review:  I have personally reviewed the results from the time of this admission to 3/9/2022 12:36 EST and agree with these findings:  [x]  Laboratory  CMP    CMP 3/9/22   Glucose 161 (A)   BUN 26 (A)   Creatinine 1.00   Sodium 133 (A)   Potassium 4.5   Chloride 91 (A)   Calcium 9.8   Albumin 3.40 (A)   Total Bilirubin 0.4   Alkaline Phosphatase 126 (A)   AST (SGOT) 19   ALT (SGPT) 23   (A) Abnormal value            CBC    CBC 3/9/22   WBC 17.72 (A)   RBC 4.39   Hemoglobin 12.6 (A)   Hematocrit 38.1   MCV 86.8   MCH 28.7   MCHC 33.1   RDW 14.6   Platelets 419   (A) Abnormal value            []  Microbiology  []  Radiology  []  EKG/Telemetry   []  Cardiology/Vascular   []  Pathology  []  Old records  []  Other:      Assessment/Plan   Assessment / Plan     Assessment:   Weakness  Abdominal pain  Metastatic disease throughout the abdomen, 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  Encourage oral  intake    DVT prophylaxis: SCDs  GI: Pepcid  Diet: Regular  Telemetry: Reviewed, sinus rhythm    Disposition: Patient unsafe for discharge home as patient's family member unable to take care of him given his severe weakness and size    Reviewed patients labs and imaging, and discussed with patient and nurse at bedside, discussed with Dr. Llamas    CODE STATUS:    Code Status (Patient has no pulse and is not breathing): CPR (Attempt to Resuscitate)  Medical Interventions (Patient has pulse or is breathing): Full Support      Admission Status:  I believe this patient meets inpatient status.      Electronically signed by Rick Patel MD, 03/09/22, 12:36 PM EST.

## 2022-03-10 ENCOUNTER — APPOINTMENT (OUTPATIENT)
Dept: INTERVENTIONAL RADIOLOGY/VASCULAR | Facility: HOSPITAL | Age: 62
End: 2022-03-10

## 2022-03-10 LAB
ALBUMIN FLD-MCNC: 2 G/DL
ALBUMIN SERPL-MCNC: 3.3 G/DL (ref 3.5–5.2)
ALBUMIN/GLOB SERPL: 1 G/DL
ALP SERPL-CCNC: 108 U/L (ref 39–117)
ALT SERPL W P-5'-P-CCNC: 16 U/L (ref 1–41)
AMYLASE FLD-CCNC: 19 U/L
ANION GAP SERPL CALCULATED.3IONS-SCNC: 10.3 MMOL/L (ref 5–15)
APPEARANCE FLD: CLEAR
AST SERPL-CCNC: 15 U/L (ref 1–40)
BASOPHILS # BLD AUTO: 0 10*3/MM3 (ref 0–0.2)
BASOPHILS NFR BLD AUTO: 0 % (ref 0–1.5)
BILIRUB SERPL-MCNC: 0.3 MG/DL (ref 0–1.2)
BUN SERPL-MCNC: 25 MG/DL (ref 8–23)
BUN/CREAT SERPL: 30.1 (ref 7–25)
CALCIUM SPEC-SCNC: 9.6 MG/DL (ref 8.6–10.5)
CANCER AG19-9 SERPL-ACNC: 154.9 U/ML
CEA SERPL-MCNC: 116 NG/ML
CHLORIDE SERPL-SCNC: 92 MMOL/L (ref 98–107)
CO2 SERPL-SCNC: 29.7 MMOL/L (ref 22–29)
COLOR FLD: NORMAL
CREAT SERPL-MCNC: 0.83 MG/DL (ref 0.76–1.27)
DEPRECATED RDW RBC AUTO: 46.1 FL (ref 37–54)
EGFRCR SERPLBLD CKD-EPI 2021: 99 ML/MIN/1.73
EOSINOPHIL # BLD AUTO: 0 10*3/MM3 (ref 0–0.4)
EOSINOPHIL NFR BLD AUTO: 0 % (ref 0.3–6.2)
ERYTHROCYTE [DISTWIDTH] IN BLOOD BY AUTOMATED COUNT: 14.6 % (ref 12.3–15.4)
GLOBULIN UR ELPH-MCNC: 3.4 GM/DL
GLUCOSE FLD-MCNC: 133 MG/DL
GLUCOSE SERPL-MCNC: 145 MG/DL (ref 65–99)
HCT VFR BLD AUTO: 34.5 % (ref 37.5–51)
HGB BLD-MCNC: 11 G/DL (ref 13–17.7)
IMM GRANULOCYTES # BLD AUTO: 0.04 10*3/MM3 (ref 0–0.05)
IMM GRANULOCYTES NFR BLD AUTO: 0.4 % (ref 0–0.5)
LDH FLD-CCNC: 138 U/L
LYMPHOCYTES # BLD AUTO: 0.97 10*3/MM3 (ref 0.7–3.1)
LYMPHOCYTES NFR BLD AUTO: 8.8 % (ref 19.6–45.3)
LYMPHOCYTES NFR FLD MANUAL: 57 %
MAGNESIUM SERPL-MCNC: 2.1 MG/DL (ref 1.6–2.4)
MCH RBC QN AUTO: 28.3 PG (ref 26.6–33)
MCHC RBC AUTO-ENTMCNC: 31.9 G/DL (ref 31.5–35.7)
MCV RBC AUTO: 88.7 FL (ref 79–97)
MONOCYTES # BLD AUTO: 0.74 10*3/MM3 (ref 0.1–0.9)
MONOCYTES NFR BLD AUTO: 6.7 % (ref 5–12)
MONOCYTES NFR FLD: 14 %
NEUTROPHILS NFR BLD AUTO: 84.1 % (ref 42.7–76)
NEUTROPHILS NFR BLD AUTO: 9.24 10*3/MM3 (ref 1.7–7)
NEUTROPHILS NFR FLD MANUAL: 29 %
NRBC BLD AUTO-RTO: 0 /100 WBC (ref 0–0.2)
NUC CELL # FLD: 631 /MM3
PHOSPHATE SERPL-MCNC: 2.4 MG/DL (ref 2.5–4.5)
PLATELET # BLD AUTO: 271 10*3/MM3 (ref 140–450)
PMV BLD AUTO: 9.6 FL (ref 6–12)
POTASSIUM SERPL-SCNC: 4.2 MMOL/L (ref 3.5–5.2)
PROT FLD-MCNC: 3.8 G/DL
PROT SERPL-MCNC: 6.7 G/DL (ref 6–8.5)
RBC # BLD AUTO: 3.89 10*6/MM3 (ref 4.14–5.8)
RBC # FLD AUTO: <2000 /MM3
SODIUM SERPL-SCNC: 132 MMOL/L (ref 136–145)
WBC NRBC COR # BLD: 10.99 10*3/MM3 (ref 3.4–10.8)

## 2022-03-10 PROCEDURE — 82150 ASSAY OF AMYLASE: CPT | Performed by: INTERNAL MEDICINE

## 2022-03-10 PROCEDURE — 82247 BILIRUBIN TOTAL: CPT | Performed by: INTERNAL MEDICINE

## 2022-03-10 PROCEDURE — 25010000002 ONDANSETRON PER 1 MG: Performed by: INTERNAL MEDICINE

## 2022-03-10 PROCEDURE — 99253 IP/OBS CNSLTJ NEW/EST LOW 45: CPT | Performed by: SURGERY

## 2022-03-10 PROCEDURE — 86301 IMMUNOASSAY TUMOR CA 19-9: CPT | Performed by: INTERNAL MEDICINE

## 2022-03-10 PROCEDURE — 89051 BODY FLUID CELL COUNT: CPT | Performed by: INTERNAL MEDICINE

## 2022-03-10 PROCEDURE — 0W9G3ZZ DRAINAGE OF PERITONEAL CAVITY, PERCUTANEOUS APPROACH: ICD-10-PCS | Performed by: RADIOLOGY

## 2022-03-10 PROCEDURE — 84100 ASSAY OF PHOSPHORUS: CPT | Performed by: INTERNAL MEDICINE

## 2022-03-10 PROCEDURE — 87075 CULTR BACTERIA EXCEPT BLOOD: CPT | Performed by: INTERNAL MEDICINE

## 2022-03-10 PROCEDURE — 82042 OTHER SOURCE ALBUMIN QUAN EA: CPT | Performed by: INTERNAL MEDICINE

## 2022-03-10 PROCEDURE — 88305 TISSUE EXAM BY PATHOLOGIST: CPT | Performed by: INTERNAL MEDICINE

## 2022-03-10 PROCEDURE — 87070 CULTURE OTHR SPECIMN AEROBIC: CPT | Performed by: INTERNAL MEDICINE

## 2022-03-10 PROCEDURE — 83735 ASSAY OF MAGNESIUM: CPT | Performed by: INTERNAL MEDICINE

## 2022-03-10 PROCEDURE — 87205 SMEAR GRAM STAIN: CPT | Performed by: INTERNAL MEDICINE

## 2022-03-10 PROCEDURE — 83615 LACTATE (LD) (LDH) ENZYME: CPT | Performed by: INTERNAL MEDICINE

## 2022-03-10 PROCEDURE — 82945 GLUCOSE OTHER FLUID: CPT | Performed by: INTERNAL MEDICINE

## 2022-03-10 PROCEDURE — 80053 COMPREHEN METABOLIC PANEL: CPT | Performed by: INTERNAL MEDICINE

## 2022-03-10 PROCEDURE — 84157 ASSAY OF PROTEIN OTHER: CPT | Performed by: INTERNAL MEDICINE

## 2022-03-10 PROCEDURE — 94799 UNLISTED PULMONARY SVC/PX: CPT

## 2022-03-10 PROCEDURE — 25010000002 HYDROMORPHONE 1 MG/ML SOLUTION: Performed by: INTERNAL MEDICINE

## 2022-03-10 PROCEDURE — 85025 COMPLETE CBC W/AUTO DIFF WBC: CPT | Performed by: INTERNAL MEDICINE

## 2022-03-10 PROCEDURE — 88108 CYTOPATH CONCENTRATE TECH: CPT | Performed by: INTERNAL MEDICINE

## 2022-03-10 PROCEDURE — 99233 SBSQ HOSP IP/OBS HIGH 50: CPT | Performed by: INTERNAL MEDICINE

## 2022-03-10 RX ORDER — ONDANSETRON 2 MG/ML
4 INJECTION INTRAMUSCULAR; INTRAVENOUS EVERY 6 HOURS PRN
Status: DISCONTINUED | OUTPATIENT
Start: 2022-03-10 | End: 2022-03-11 | Stop reason: HOSPADM

## 2022-03-10 RX ORDER — SODIUM CHLORIDE 0.9 % (FLUSH) 0.9 %
10 SYRINGE (ML) INJECTION EVERY 12 HOURS SCHEDULED
Status: DISCONTINUED | OUTPATIENT
Start: 2022-03-10 | End: 2022-03-11 | Stop reason: HOSPADM

## 2022-03-10 RX ORDER — MULTIPLE VITAMINS W/ MINERALS TAB 9MG-400MCG
1 TAB ORAL DAILY
Status: DISCONTINUED | OUTPATIENT
Start: 2022-03-10 | End: 2022-03-11 | Stop reason: HOSPADM

## 2022-03-10 RX ORDER — CHOLECALCIFEROL (VITAMIN D3) 125 MCG
5 CAPSULE ORAL NIGHTLY PRN
Status: DISCONTINUED | OUTPATIENT
Start: 2022-03-10 | End: 2022-03-11 | Stop reason: HOSPADM

## 2022-03-10 RX ORDER — POLYETHYLENE GLYCOL 3350 17 G/17G
17 POWDER, FOR SOLUTION ORAL DAILY PRN
Status: DISCONTINUED | OUTPATIENT
Start: 2022-03-10 | End: 2022-03-11 | Stop reason: HOSPADM

## 2022-03-10 RX ORDER — FAMOTIDINE 20 MG/1
40 TABLET, FILM COATED ORAL DAILY
Status: DISCONTINUED | OUTPATIENT
Start: 2022-03-10 | End: 2022-03-11 | Stop reason: HOSPADM

## 2022-03-10 RX ORDER — AMOXICILLIN 250 MG
2 CAPSULE ORAL 2 TIMES DAILY
Status: DISCONTINUED | OUTPATIENT
Start: 2022-03-10 | End: 2022-03-11 | Stop reason: HOSPADM

## 2022-03-10 RX ORDER — SODIUM CHLORIDE 0.9 % (FLUSH) 0.9 %
10 SYRINGE (ML) INJECTION AS NEEDED
Status: DISCONTINUED | OUTPATIENT
Start: 2022-03-10 | End: 2022-03-11 | Stop reason: HOSPADM

## 2022-03-10 RX ORDER — BISACODYL 10 MG
10 SUPPOSITORY, RECTAL RECTAL DAILY PRN
Status: DISCONTINUED | OUTPATIENT
Start: 2022-03-10 | End: 2022-03-11 | Stop reason: HOSPADM

## 2022-03-10 RX ORDER — BISACODYL 5 MG/1
5 TABLET, DELAYED RELEASE ORAL DAILY PRN
Status: DISCONTINUED | OUTPATIENT
Start: 2022-03-10 | End: 2022-03-11 | Stop reason: HOSPADM

## 2022-03-10 RX ORDER — LIDOCAINE HYDROCHLORIDE 20 MG/ML
INJECTION, SOLUTION INFILTRATION; PERINEURAL
Status: COMPLETED
Start: 2022-03-10 | End: 2022-03-10

## 2022-03-10 RX ADMIN — HYDROMORPHONE HYDROCHLORIDE 0.5 MG: 1 INJECTION, SOLUTION INTRAMUSCULAR; INTRAVENOUS; SUBCUTANEOUS at 09:42

## 2022-03-10 RX ADMIN — HYDROMORPHONE HYDROCHLORIDE 1 MG: 1 INJECTION, SOLUTION INTRAMUSCULAR; INTRAVENOUS; SUBCUTANEOUS at 16:56

## 2022-03-10 RX ADMIN — ONDANSETRON 4 MG: 2 INJECTION INTRAMUSCULAR; INTRAVENOUS at 16:56

## 2022-03-10 RX ADMIN — LIDOCAINE HYDROCHLORIDE 10 ML: 20 INJECTION, SOLUTION INFILTRATION; PERINEURAL at 11:35

## 2022-03-10 RX ADMIN — SENNOSIDES AND DOCUSATE SODIUM 2 TABLET: 50; 8.6 TABLET ORAL at 20:36

## 2022-03-10 RX ADMIN — HYDROMORPHONE HYDROCHLORIDE 0.5 MG: 1 INJECTION, SOLUTION INTRAMUSCULAR; INTRAVENOUS; SUBCUTANEOUS at 04:44

## 2022-03-10 RX ADMIN — HYDROMORPHONE HYDROCHLORIDE 1 MG: 1 INJECTION, SOLUTION INTRAMUSCULAR; INTRAVENOUS; SUBCUTANEOUS at 13:08

## 2022-03-10 RX ADMIN — Medication 10 ML: at 09:08

## 2022-03-10 RX ADMIN — SENNOSIDES AND DOCUSATE SODIUM 2 TABLET: 50; 8.6 TABLET ORAL at 09:42

## 2022-03-10 RX ADMIN — ONDANSETRON 4 MG: 2 INJECTION INTRAMUSCULAR; INTRAVENOUS at 09:42

## 2022-03-10 RX ADMIN — Medication 10 ML: at 09:43

## 2022-03-10 RX ADMIN — Medication 10 ML: at 20:37

## 2022-03-10 RX ADMIN — FAMOTIDINE 40 MG: 20 TABLET ORAL at 09:42

## 2022-03-10 RX ADMIN — Medication 1 TABLET: at 09:42

## 2022-03-10 NOTE — CONSULTS
[facility]   Consult    Patient Name: Ramesh Mccann  : 1960  MRN: [id number]  Primary Care Physician:  Provider, No Known  Date of admission: 3/9/2022    Subjective   Subjective     Chief Complaint: Weakness, abdominal pain, nausea    HPI:    Ramesh Mccann is a 61 y.o. male admitted for weakness abdominal pain and nausea.  His admission work-up included a CT scan which shows evidence of a pancreatic mass with liver metastases and a likely metastasis to the right lower quadrant.  This right lower quadrant mass appears to be causing a partial obstruction.  Currently the patient reports he feels well but is requiring multiple medications for nausea.  Pain seems to be better controlled.    Review of Systems   Constitutional: Positive for activity change, appetite change and unexpected weight loss.   HENT: Negative.    Respiratory: Negative.    Cardiovascular: Negative.    Gastrointestinal: Positive for abdominal distention, abdominal pain and nausea.   Hematological: Negative.         Personal History     Past Medical History:   Diagnosis Date   • COPD (chronic obstructive pulmonary disease) (HCC)    • Hypertension    • Stroke (HCC)        History reviewed. No pertinent surgical history.    Family History: family history is not on file. Otherwise pertinent FHx was reviewed and not pertinent to current issue.    Social History:  reports that he has been smoking cigarettes. He has been smoking about 1.00 pack per day. He does not have any smokeless tobacco history on file. He reports previous alcohol use. He reports that he does not use drugs.    Home Medications:  Multiple Vitamins-Minerals, acetaminophen, amLODIPine, atorvastatin, cefuroxime, lisinopril, predniSONE, and pregabalin      Allergies:  Allergies   Allergen Reactions   • Penicillins Hives       Objective   Objective     Vitals:   Temp:  [97.7 °F (36.5 °C)-98 °F (36.7 °C)] 97.8 °F (36.6 °C)  Heart Rate:  [] 102  Resp:  [18] 18  BP:  (105-124)/(67-79) 120/72    Physical Exam  Constitutional:       Appearance: He is ill-appearing.   Cardiovascular:      Rate and Rhythm: Normal rate.   Pulmonary:      Effort: Pulmonary effort is normal.   Abdominal:      General: There is distension.   Skin:     General: Skin is warm.          Result Review    Result Review:  I have personally reviewed the results from the time of this admission to 3/10/2022 18:07 EST and agree with these findings:  []  Laboratory  []  Microbiology  [x]  Radiology  []  EKG/Telemetry   []  Cardiology/Vascular   []  Pathology  []  Old records  []  Other:    [unfilled]  @[email or web address]@  Lab Results   Component Value Date    ALT 16 03/10/2022      Most notable findings include: CT showing cirrhosis, pancreatic mass, liver metastases, right lower quadrant mass, partial obstruction    Assessment/Plan   Assessment / Plan     Brief Patient Summary:  Ramesh Mccann is a 61 y.o. male who has likely partial obstruction from diffuse intra-abdominal cancer    Active Hospital Problems:  Active Hospital Problems    Diagnosis    • Abdominal mass      Plan:  Discussed with the patient his sister at bedside  Does not have a surgical option for treatment  The only thing I can offer our surgical options for palliation  I discussed with him the possibility of a small bowel to colonic bypass,  an ileostomy, or a PEG tube  The pros and cons of these were discussed extensively  Also discussed the possibility of complications with operation  The sister and the patient have plans for a conference call with his children this evening  I will come back and talk with them about what of her options they choose to pursue tomorrow    DVT prophylaxis:  Mechanical DVT prophylaxis orders are present.    CODE STATUS:    Code Status (Patient has no pulse and is not breathing): CPR (Attempt to Resuscitate)  Medical Interventions (Patient has pulse or is breathing): Full Support    Admission Status:  I believe this patient meets  inpatient status.    Electronically signed by Livan Snell MD, 03/10/22, 6:07 PM EST.

## 2022-03-10 NOTE — PROGRESS NOTES
[facility]   Hospitalist Progress Note  Date: 3/10/2022  Patient Name: Ramesh Mccann  : 1960  MRN: [id number]  Date of admission: 3/9/2022    Subjective   Subjective     Chief Complaint:   Follow-up abdominal pain    Summary:   Ramesh Mccann is a 61 y.o. male who presents to the hospital with a 1 month history of worsening weakness, worsening appetite.  Patient states that his sister brought him up from [place] to help care for him as he is progressively getting worse.  They brought the patient to the emergency department today for worsening weakness, inability to walk, abdominal pain.  In the emergency department the patient underwent CT scan of the abdomen which was significant for multiple hepatic masses, pancreatic mass, some small bowel distention, moderate ascites.  Because of the inability to walk and no safe discharge plan the patient was admitted to the hospital for further evaluation and management.    Interval Followup:   Patient still with abdominal pain, distention.  Patient scheduled for paracentesis today    Review of Systems  Positive nausea and vomiting  Positive abdominal pain  No fever no chills    Objective   Objective     Vitals:   Temp:  [97.16 °F (36.2 °C)-98 °F (36.7 °C)] 97.9 °F (36.6 °C)  Heart Rate:  [] 108  Resp:  [16-18] 18  BP: (105-124)/(65-79) 124/79    Physical Exam   General appearance: NAD, conversant   Eyes: anicteric sclerae, moist conjunctivae; no lid-lag; PERRLA  HENT: Atraumatic; oropharynx clear with moist mucous membranes and no mucosal ulcerations; normal hard and soft palate  Neck: Trachea midline; FROM, supple, no thyromegaly or lymphadenopathy  Lungs: CTA, with normal respiratory effort and no intercostal retractions  CV: Regular Rate and Rhythm, no Murmurs, Rubs, or Gallops   Abdomen: Soft, distended, diffusely tender; no masses or hepatosplenomegaly  Extremities: No peripheral edema or extremity lymphadenopathy  Skin: Normal temperature, turgor and  texture; no rash, ulcers or subcutaneous nodules  Psych: Appropriate affect, alert and oriented to person, place and time  Neuro: CN II - XII intact no motor deficits, no sensory deficits, globally weak    Result Review    Result Review:  I have personally reviewed the results from the time of this admission to 3/10/2022 12:57 EST and agree with these findings:  [x]  Laboratory  CBC    CBC 3/9/22   WBC 17.72 (A)   RBC 4.39   Hemoglobin 12.6 (A)   Hematocrit 38.1   MCV 86.8   MCH 28.7   MCHC 33.1   RDW 14.6   Platelets 419   (A) Abnormal value            CMP    CMP 3/9/22 3/10/22   Glucose 161 (A) 145 (A)   BUN 26 (A) 25 (A)   Creatinine 1.00 0.83   Sodium 133 (A) 132 (A)   Potassium 4.5 4.2   Chloride 91 (A) 92 (A)   Calcium 9.8 9.6   Albumin 3.40 (A) 3.30 (A)   Total Bilirubin 0.4 0.3   Alkaline Phosphatase 126 (A) 108   AST (SGOT) 19 15   ALT (SGPT) 23 16   (A) Abnormal value            []  Microbiology  []  Radiology  []  EKG/Telemetry   []  Cardiology/Vascular   []  Pathology  []  Old records  []  Other:    Assessment/Plan   Assessment / Plan     Assessment:  Weakness  Abdominal pain  Metastatic disease throughout the abdomen, possible pancreatic in nature  History of hypertension  History of hyperlipidemia  History of alcoholism,  COPD not in acute exacerbation  Nicotine dependence     Plan:  Patient admitted to the hospital for further work-up and management of above  PT OT consulted  Routine labs reviewed  CT scan reviewed, consistent with metastatic disease  Check CEA and CA 19-9  Will need oncology consultation  General surgery to review CT scan of the abdomen given partial small bowel obstruction related to metastatic disease  Resume appropriate home medications once reconciled  INR reviewed  Paracentesis today, follow-up cell count, differential, cytology  Zofran for nausea  Increase IV Dilaudid for better pain control  Encourage oral intake     DVT prophylaxis: SCDs  GI: Pepcid  Diet: Regular  Telemetry:  Reviewed, sinus rhythm     Disposition: Patient unsafe for discharge home as patient's family member unable to take care of him given his severe weakness and size    Reviewed patients labs and imaging, and discussed with patient and nurse at bedside.    DVT prophylaxis:  Mechanical DVT prophylaxis orders are present.    CODE STATUS:   Code Status (Patient has no pulse and is not breathing): CPR (Attempt to Resuscitate)  Medical Interventions (Patient has pulse or is breathing): Full Support        Electronically signed by Rick Patel MD, 03/10/22, 12:58 PM EST.

## 2022-03-10 NOTE — SIGNIFICANT NOTE
03/10/22 1030   Plan   Plan Met with patient and sister Carmen.  Patient lives with sister that assist with needs and provides transportation.  Reports patients room is downstairs but will be moving upstairs due to stairs in basement.  Patient moved to [place] end of last year and now has [place] insurance coverage.  Sister would like a bed and other DME at discharge but is unsure of equipment at this time.  Request home health agency.  Sister would like to speak with palliative concerning living will.  Palliative has been consulted.  Patient plans to return home with DME and HH at this time   Patient/Family in Agreement with Plan yes

## 2022-03-10 NOTE — PLAN OF CARE
Goal Outcome Evaluation:  Plan of Care Reviewed With: patient, sibling        Progress: no change  Outcome Evaluation: Pt requiring frequent pain meds and nausea meds. VSS, will continue to monitor. Pt had paracentesis today, tolerated well. Zach Ahumada RN

## 2022-03-10 NOTE — CONSULTS
"Nutrition Services    Patient Name: Ramesh Mccann  YOB: 1960  MRN: [id number]  Admission date: 3/9/2022      CLINICAL NUTRITION ASSESSMENT      Reason for Assessment  Physician consult     H&P:    Past Medical History:   Diagnosis Date   • COPD (chronic obstructive pulmonary disease) (HCC)    • Hypertension    • Stroke (HCC)         Current Problems:   Active Hospital Problems    Diagnosis    • Abdominal mass         Nutrition/Diet History         Narrative     Pt admitted with poor appetite x 1 month with abdominal pain. Pt found to have multiple hepatic masses, pancreatic mass, some small bowel distention, moderate ascites and is on clear liquids at this time. Pt is also schedules for paracentesis today. He states that he used to have a great appetite a month ago but with the onset of abd pain he has consumed less and feels full quickly. Has tried several ONS with limited acceptance, willing to try Magic Cup TID once pt is progressed to full liquid diet. At this time he has moderate acceptance of Boost Breeze, emphasized the importance while on clear liquids. Will follow for diet advancement. UBW ~165# a month ago per family. 17# (10.3%) wt loss x1 month which is clinically significant. NFPE performed, severe malnutrition identified.      Anthropometrics        Current Height, Weight Height: 182.9 cm (72\")  Weight: 67.4 kg (148 lb 9.4 oz)   Current BMI Body mass index is 20.15 kg/m².       Weight Hx  Wt Readings from Last 30 Encounters:   03/09/22 0905 67.4 kg (148 lb 9.4 oz)            Wt Change Observation  17# (10.3%) wt loss x1     Malnutrition Severity Assessment      Patient meets criteria for : Severe Malnutrition  Malnutrition Type (last 8 hours)     Malnutrition Severity Assessment     Row Name 03/10/22 1421       Malnutrition Severity Assessment    Malnutrition Type Acute Disease or Injury - Related Malnutrition    Row Name 03/10/22 1421       Insufficient Energy Intake     Insufficient " Energy Intake Findings Severe    Insufficient Energy Intake  <50% of est. energy requirement for >or equal to 1 month    Row Name 03/10/22 1421       Unintentional Weight Loss     Unintentional Weight Loss Findings Severe    Unintentional Weight Loss  Weight loss greater than 5% in one month    Row Name 03/10/22 1421       Muscle Loss    Loss of Muscle Mass Findings Severe    Catholic Region Severe - deep hollowing/scooping, lack of muscle to touch, facial bones well defined    Clavicle Bone Region Severe - protruding prominent bone    Acromion Bone Region Severe - squared shoulders, bones, and acromion process protrusion prominent    Dorsal Hand Region Moderate - slight depression    Row Name 03/10/22 1421       Fat Loss    Subcutaneous Fat Loss Findings Severe    Orbital Region  Moderate -  somewhat hollowness, slightly dark circles    Upper Arm Region Severe - mostly skin, very little space between folds, fingers touch    Row Name 03/10/22 1421       Criteria Met (Must meet criteria for severity in at least 2 of these categories: M Wasting, Fat Loss, Fluid, Secondary Signs, Wt. Status, Intake)    Patient meets criteria for  Severe Malnutrition                     Estimated/Assessed Needs       Energy Requirements 30-35 kcal/kg   EST Needs (kcal/day) 2022-2359       Protein Requirements 1.2 g/kg   EST Daily Needs (g/day) 81       Fluid Requirements 1 ml/kcal    Estimated Needs (mL/day) 2022     Labs/Medications         Pertinent Labs Reviewed.   Results from last 7 days   Lab Units 03/10/22  0951 03/09/22  0917   SODIUM mmol/L 132* 133*   POTASSIUM mmol/L 4.2 4.5   CHLORIDE mmol/L 92* 91*   CO2 mmol/L 29.7* 28.3   BUN mg/dL 25* 26*   CREATININE mg/dL 0.83 1.00   CALCIUM mg/dL 9.6 9.8   BILIRUBIN mg/dL 0.3 0.4   ALK PHOS U/L 108 126*   ALT (SGPT) U/L 16 23   AST (SGOT) U/L 15 19   GLUCOSE mg/dL 145* 161*     Results from last 7 days   Lab Units 03/10/22  1332 03/10/22  0951   MAGNESIUM mg/dL  --  2.1   PHOSPHORUS  mg/dL  --  2.4*   HEMOGLOBIN g/dL 11.0*  --    HEMATOCRIT % 34.5*  --        Pertinent Medications Reviewed.     Current Nutrition Orders & Evaluation of Intake       Oral Nutrition     Current PO Diet Diet Clear Liquid   Supplement No active supplement orders       Nutrition Diagnosis         Nutrition Dx Problem 1 Severe malnutrition related to inadequate energy Intake as evidenced by decreased appetite., unintended wt change., body composition changes., patient report., family report. and report of minimal PO intake.       Nutrition Intervention         Boost Breeze TID     Medical Nutrition Therapy/Nutrition Education          Learner     Readiness Patient and Family  Acceptance     Method     Response Explanation  Verbalizes understanding     Monitor/Evaluation        Monitor Per protocol, PO intake, Supplement intake, Weight, GI status, Symptoms, POC/GOC, Diet advancement       Nutrition Discharge Plan         To be determined       Electronically signed by:  Karla Amaya RD  03/10/22 13:55 EST

## 2022-03-10 NOTE — CONSULTS
Purpose of the visit was to evaluate for: goals of care/advanced care planning. Spoke with RN, patient and family and discussed palliative care, goals of care, care options, resuscitation status, [facility] and advanced care planning.      Assessment: Patient is currently on 5stu. Patient laying in bed with eyes closed at time of visit. Patient opened eyes upon palliative care arrival. Patient is alert to person, place, time and situation at time of visit. Patient sister at bedside. Introduced self and explained role. Discussed patients current condition. Patients sister reports new diagnosis of probable cancer. Discussed goals of care and care options moving forward, including hospice services. Patient and patients sister request to speak with providers further regarding patients treatment options and prognosis before making further decisions regarding goals of care. Discussed and educated on advanced care planning/living ramírez. Patients sister expresses interest in patient completing living will in order for patient to define his wishes. Discussed and educated on code status, full code versus do not resuscitate. At this time, patient and patients sister requests to discuss further amongst themselves before making further decisions. Provided multiple handouts for education on advanced care planning, hospice services, pallitus, and information on code status. Patient and patients sister express importance of controlling patients pain. Emotional support provided. Updated primary rn and case manger/.      Recommendations/Plan: Further decisions to be made based on patients clinical course.    Tasks Completed: Emotional Support.    Palliative care will continue to follow to support and assist.    KAMARI Red, RN  Palliative Care

## 2022-03-10 NOTE — PLAN OF CARE
Goal Outcome Evaluation:  Plan of Care Reviewed With: patient        Progress: no change  Outcome Evaluation: VSS.  c/o rt sided abd pain.  Dilaudid iv given and pt states pain is better.

## 2022-03-11 VITALS
DIASTOLIC BLOOD PRESSURE: 56 MMHG | HEIGHT: 72 IN | RESPIRATION RATE: 18 BRPM | HEART RATE: 89 BPM | TEMPERATURE: 97.4 F | WEIGHT: 147.05 LBS | OXYGEN SATURATION: 92 % | SYSTOLIC BLOOD PRESSURE: 110 MMHG | BODY MASS INDEX: 19.92 KG/M2

## 2022-03-11 PROBLEM — E43 SEVERE MALNUTRITION: Status: ACTIVE | Noted: 2022-03-11

## 2022-03-11 LAB
ALBUMIN SERPL-MCNC: 2.9 G/DL (ref 3.5–5.2)
ALBUMIN/GLOB SERPL: 0.9 G/DL
ALP SERPL-CCNC: 96 U/L (ref 39–117)
ALT SERPL W P-5'-P-CCNC: 14 U/L (ref 1–41)
ANION GAP SERPL CALCULATED.3IONS-SCNC: 8.8 MMOL/L (ref 5–15)
AST SERPL-CCNC: 14 U/L (ref 1–40)
BASOPHILS # BLD AUTO: 0.01 10*3/MM3 (ref 0–0.2)
BASOPHILS NFR BLD AUTO: 0.1 % (ref 0–1.5)
BILIRUB SERPL-MCNC: 0.3 MG/DL (ref 0–1.2)
BUN SERPL-MCNC: 19 MG/DL (ref 8–23)
BUN/CREAT SERPL: 20.4 (ref 7–25)
CALCIUM SPEC-SCNC: 9.2 MG/DL (ref 8.6–10.5)
CHLORIDE SERPL-SCNC: 94 MMOL/L (ref 98–107)
CO2 SERPL-SCNC: 28.2 MMOL/L (ref 22–29)
CREAT SERPL-MCNC: 0.93 MG/DL (ref 0.76–1.27)
DEPRECATED RDW RBC AUTO: 45.5 FL (ref 37–54)
EGFRCR SERPLBLD CKD-EPI 2021: 92.8 ML/MIN/1.73
EOSINOPHIL # BLD AUTO: 0.02 10*3/MM3 (ref 0–0.4)
EOSINOPHIL NFR BLD AUTO: 0.2 % (ref 0.3–6.2)
ERYTHROCYTE [DISTWIDTH] IN BLOOD BY AUTOMATED COUNT: 14.3 % (ref 12.3–15.4)
GLOBULIN UR ELPH-MCNC: 3.3 GM/DL
GLUCOSE SERPL-MCNC: 125 MG/DL (ref 65–99)
HCT VFR BLD AUTO: 37.4 % (ref 37.5–51)
HGB BLD-MCNC: 11.7 G/DL (ref 13–17.7)
IMM GRANULOCYTES # BLD AUTO: 0.06 10*3/MM3 (ref 0–0.05)
IMM GRANULOCYTES NFR BLD AUTO: 0.6 % (ref 0–0.5)
LYMPHOCYTES # BLD AUTO: 0.99 10*3/MM3 (ref 0.7–3.1)
LYMPHOCYTES NFR BLD AUTO: 9.3 % (ref 19.6–45.3)
MAGNESIUM SERPL-MCNC: 2 MG/DL (ref 1.6–2.4)
MCH RBC QN AUTO: 27.7 PG (ref 26.6–33)
MCHC RBC AUTO-ENTMCNC: 31.3 G/DL (ref 31.5–35.7)
MCV RBC AUTO: 88.6 FL (ref 79–97)
MONOCYTES # BLD AUTO: 0.81 10*3/MM3 (ref 0.1–0.9)
MONOCYTES NFR BLD AUTO: 7.6 % (ref 5–12)
NEUTROPHILS NFR BLD AUTO: 8.8 10*3/MM3 (ref 1.7–7)
NEUTROPHILS NFR BLD AUTO: 82.2 % (ref 42.7–76)
NRBC BLD AUTO-RTO: 0 /100 WBC (ref 0–0.2)
PHOSPHATE SERPL-MCNC: 2.8 MG/DL (ref 2.5–4.5)
PLATELET # BLD AUTO: 256 10*3/MM3 (ref 140–450)
PMV BLD AUTO: 9.6 FL (ref 6–12)
POTASSIUM SERPL-SCNC: 3.9 MMOL/L (ref 3.5–5.2)
PROT SERPL-MCNC: 6.2 G/DL (ref 6–8.5)
RBC # BLD AUTO: 4.22 10*6/MM3 (ref 4.14–5.8)
SODIUM SERPL-SCNC: 131 MMOL/L (ref 136–145)
WBC NRBC COR # BLD: 10.69 10*3/MM3 (ref 3.4–10.8)

## 2022-03-11 PROCEDURE — 25010000002 HYDROMORPHONE 1 MG/ML SOLUTION: Performed by: INTERNAL MEDICINE

## 2022-03-11 PROCEDURE — 97161 PT EVAL LOW COMPLEX 20 MIN: CPT

## 2022-03-11 PROCEDURE — 80053 COMPREHEN METABOLIC PANEL: CPT | Performed by: INTERNAL MEDICINE

## 2022-03-11 PROCEDURE — 84100 ASSAY OF PHOSPHORUS: CPT | Performed by: INTERNAL MEDICINE

## 2022-03-11 PROCEDURE — 85025 COMPLETE CBC W/AUTO DIFF WBC: CPT | Performed by: INTERNAL MEDICINE

## 2022-03-11 PROCEDURE — 99231 SBSQ HOSP IP/OBS SF/LOW 25: CPT | Performed by: SURGERY

## 2022-03-11 PROCEDURE — 99239 HOSP IP/OBS DSCHRG MGMT >30: CPT | Performed by: INTERNAL MEDICINE

## 2022-03-11 PROCEDURE — 97166 OT EVAL MOD COMPLEX 45 MIN: CPT

## 2022-03-11 PROCEDURE — 83735 ASSAY OF MAGNESIUM: CPT | Performed by: INTERNAL MEDICINE

## 2022-03-11 RX ORDER — FAMOTIDINE 40 MG/1
40 TABLET, FILM COATED ORAL DAILY
Qty: 30 TABLET | Refills: 0 | Status: SHIPPED | OUTPATIENT
Start: 2022-03-12

## 2022-03-11 RX ORDER — AMOXICILLIN 250 MG
2 CAPSULE ORAL DAILY
Qty: 60 TABLET | Refills: 0 | Status: SHIPPED | OUTPATIENT
Start: 2022-03-11

## 2022-03-11 RX ORDER — OXYCODONE HYDROCHLORIDE 5 MG/1
5 TABLET ORAL EVERY 4 HOURS PRN
Qty: 60 TABLET | Refills: 0 | Status: SHIPPED | OUTPATIENT
Start: 2022-03-11 | End: 2022-03-26

## 2022-03-11 RX ORDER — ONDANSETRON 4 MG/1
4 TABLET, ORALLY DISINTEGRATING ORAL EVERY 8 HOURS PRN
Qty: 30 TABLET | Refills: 0 | Status: SHIPPED | OUTPATIENT
Start: 2022-03-11

## 2022-03-11 RX ADMIN — HYDROMORPHONE HYDROCHLORIDE 1 MG: 1 INJECTION, SOLUTION INTRAMUSCULAR; INTRAVENOUS; SUBCUTANEOUS at 10:17

## 2022-03-11 RX ADMIN — Medication 10 ML: at 10:24

## 2022-03-11 RX ADMIN — HYDROMORPHONE HYDROCHLORIDE 1 MG: 1 INJECTION, SOLUTION INTRAMUSCULAR; INTRAVENOUS; SUBCUTANEOUS at 08:15

## 2022-03-11 RX ADMIN — FAMOTIDINE 40 MG: 20 TABLET ORAL at 08:14

## 2022-03-11 RX ADMIN — SENNOSIDES AND DOCUSATE SODIUM 2 TABLET: 50; 8.6 TABLET ORAL at 08:14

## 2022-03-11 RX ADMIN — HYDROMORPHONE HYDROCHLORIDE 1 MG: 1 INJECTION, SOLUTION INTRAMUSCULAR; INTRAVENOUS; SUBCUTANEOUS at 12:23

## 2022-03-11 RX ADMIN — HYDROMORPHONE HYDROCHLORIDE 1 MG: 1 INJECTION, SOLUTION INTRAMUSCULAR; INTRAVENOUS; SUBCUTANEOUS at 03:27

## 2022-03-11 RX ADMIN — HYDROMORPHONE HYDROCHLORIDE 1 MG: 1 INJECTION, SOLUTION INTRAMUSCULAR; INTRAVENOUS; SUBCUTANEOUS at 15:16

## 2022-03-11 RX ADMIN — Medication 1 TABLET: at 08:14

## 2022-03-11 NOTE — THERAPY EVALUATION
Patient Name: Ramesh Mccann  : 1960    MRN: [id number]                              Today's Date: 3/11/2022       Admit Date: 3/9/2022    Visit Dx:     ICD-10-CM ICD-9-CM   1. Abdominal mass, unspecified abdominal location  R19.00 789.30   2. Difficulty walking  R26.2 719.7   3. Decreased activities of daily living (ADL)  Z78.9 V49.89     Patient Active Problem List   Diagnosis   • Abdominal mass     Past Medical History:   Diagnosis Date   • COPD (chronic obstructive pulmonary disease) (HCC)    • Hypertension    • Stroke (HCC)      History reviewed. No pertinent surgical history.   General Information     Row Name 22 1252          OT Time and Intention    Document Type evaluation  -LF     Mode of Treatment individual therapy;occupational therapy  -     Row Name 22 1252          General Information    Patient Profile Reviewed yes  -LF     Prior Level of Function --  Typically (I)w/ADLs but has required assist recently from sister, uses a STC and has a standard walker, has a walk-in shower with shower stool available, standard commode, stands to groom, no longer drives, and no home O2.  -     Existing Precautions/Restrictions fall  -     Barriers to Rehab medically complex  -     Row Name 22 1252          Occupational Profile    Reason for Services/Referral (Occupational Profile) Occupational therapy consulted due to recent decline in ADLs/functional transfers. No previous occupational therapy services for current condition.  -     Row Name 22 1252          Living Environment    People in Home sibling(s)  sister  -     Row Name 22 1252          Home Main Entrance    Number of Stairs, Main Entrance two  from basement entrance  -LF     Stair Railings, Main Entrance none  -     Row Name 22 1252          Stairs Within Home, Primary    Stairs, Within Home, Primary One flight from the basement to the main level but patient will be residing in the basement.  -LF      Stair Railings, Within Home, Primary railings safe and in good condition  -     Row Name 03/11/22 1252          Cognition    Orientation Status (Cognition) oriented to;person  Reversed and spoke minimally to therapist.  -     Row Name 03/11/22 1252          Safety Issues, Functional Mobility    Impairments Affecting Function (Mobility) balance;endurance/activity tolerance;strength  -     Comment, Safety Issues/Impairments (Mobility) Therapeutic exercises to address endurance.  -           User Key  (r) = Recorded By, (t) = Taken By, (c) = Cosigned By    Initials Name Provider Type     Gracy Root OT Occupational Therapist                 Mobility/ADL's     Row Name 03/11/22 1254          Bed Mobility    Comment, (Bed Mobility) Patient upright and seated in recliner.  -[facility] Name 03/11/22 1254          Transfers    Transfers sit-stand transfer  -     Sit-Stand [place] (Transfers) contact guard;minimum assist (75% patient effort)  -[facility] Name 03/11/22 1254          Sit-Stand Transfer    Assistive Device (Sit-Stand Transfers) other (see comments)  HHA  -     Row Name 03/11/22 1254          Activities of Daily Living    BADL Assessment/Intervention bathing;upper body dressing;lower body dressing;grooming;feeding;toileting  -[facility] Name 03/11/22 1254          Bathing Assessment/Intervention    [place] Level (Bathing) bathing skills;upper body;minimum assist (75% patient effort);lower body;moderate assist (50% patient effort)  -     Row Name 03/11/22 1254          Upper Body Dressing Assessment/Training    [place] Level (Upper Body Dressing) upper body dressing skills;minimum assist (75% patient effort)  -     Row Name 03/11/22 1254          Lower Body Dressing Assessment/Training    [place] Level (Lower Body Dressing) lower body dressing skills;moderate assist (50% patient effort)  -     Row Name 03/11/22 1254          Grooming Assessment/Training     [place] Level (Grooming) grooming skills;minimum assist (75% patient effort)  -[facility] Name 03/11/22 1254          Self-Feeding Assessment/Training    [place] Level (Feeding) feeding skills;set up  -[facility] Name 03/11/22 1254          Toileting Assessment/Training    [place] Level (Toileting) toileting skills;moderate assist (50% patient effort)  -           User Key  (r) = Recorded By, (t) = Taken By, (c) = Cosigned By    Initials Name Provider Type     Gracy Root OT Occupational Therapist               Obj/Interventions     Row Name 03/11/22 1255          Sensory Assessment (Somatosensory)    Sensory Assessment (Somatosensory) UE sensation intact  -[facility] Name 03/11/22 1255          Vision Assessment/Intervention    Visual Impairment/Limitations WFL  -LF     Row Name 03/11/22 1255          Range of Motion Comprehensive    Comment, General Range of Motion Grossly 90° right shoulder flexion, full AROM of elbow, and wrist/digits contracted in flexion with full PROM. With patient and sister reporting these deficits are due to prior CVA. Full AROM of left upper extremity throughout.  -[facility] Name 03/11/22 1255          Strength Comprehensive (MMT)    Comment, General Manual Muscle Testing (MMT) Assessment Right proximal upper extremity 4-/5 and distally 3+/5 due to previous CVA. Left upper extremity 4/5 throughout.  -[facility] Name 03/11/22 1255          Motor Skills    Motor Skills coordination;functional endurance  -     Coordination right;severe impairment;left;WFL  -     Functional Endurance Poor  -LF     Row Name 03/11/22 1255          Balance    Balance Assessment sitting dynamic balance;standing dynamic balance  -     Dynamic Sitting Balance independent  -     Position, Sitting Balance unsupported;sitting in chair  -     Dynamic Standing Balance minimal assist  -     Position/Device Used, Standing Balance supported  -           User Key  (r) = Recorded By,  (t) = Taken By, (c) = Cosigned By    Initials Name Provider Type    LF Gracy Root, OT Occupational Therapist               Goals/Plan     Row Name 03/11/22 1258          Bed Mobility Goal 1 (OT)    Activity/Assistive Device (Bed Mobility Goal 1, OT) bed mobility activities, all  -LF     [place] Level/Cues Needed (Bed Mobility Goal 1, OT) modified independence  -LF     Time Frame (Bed Mobility Goal 1, OT) long term goal (LTG);10 days  -LF     Row Name 03/11/22 1258          Transfer Goal 1 (OT)    Activity/Assistive Device (Transfer Goal 1, OT) transfers, all;walker, rolling  -LF     [place] Level/Cues Needed (Transfer Goal 1, OT) modified independence  -LF     Time Frame (Transfer Goal 1, OT) long term goal (LTG);10 days  -LF     Row Name 03/11/22 1258          Bathing Goal 1 (OT)    Activity/Device (Bathing Goal 1, OT) bathing skills, all  -LF     [place] Level/Cues Needed (Bathing Goal 1, OT) modified independence  -LF     Time Frame (Bathing Goal 1, OT) long term goal (LTG);10 days  -LF     Row Name 03/11/22 1258          Dressing Goal 1 (OT)    Activity/Device (Dressing Goal 1, OT) dressing skills, all  -LF     [place]/Cues Needed (Dressing Goal 1, OT) modified independence  -LF     Time Frame (Dressing Goal 1, OT) long term goal (LTG);10 days  -LF     Row Name 03/11/22 1258          Toileting Goal 1 (OT)    Activity/Device (Toileting Goal 1, OT) toileting skills, all  -LF     [place] Level/Cues Needed (Toileting Goal 1, OT) modified independence  -LF     Time Frame (Toileting Goal 1, OT) long term goal (LTG);10 days  -LF     Row Name 03/11/22 1258          Therapy Assessment/Plan (OT)    Planned Therapy Interventions (OT) activity tolerance training;patient/caregiver education/training;BADL retraining;functional balance retraining;occupation/activity based interventions;transfer/mobility retraining;ROM/therapeutic exercise;strengthening exercise  -LF           User Key  (r) =  Recorded By, (t) = Taken By, (c) = Cosigned By    Initials Name Provider Type     Gracy Root OT Occupational Therapist               Clinical Impression     Row Name 03/11/22 1257          Pain Assessment    Additional Documentation Pain Scale: FACES Pre/Post-Treatment (Group)  -     Row Name 03/11/22 1257          Pain Scale: FACES Pre/Post-Treatment    Pain: FACES Scale, Pretreatment 0-->no hurt  -LF     Posttreatment Pain Rating 0-->no hurt  -     Row Name 03/11/22 1257          Plan of Care Review    Plan of Care Reviewed With patient  -     Progress no change  -     Outcome Evaluation Patient presents with limitations in self-care, functional transfers, balance, BUE strength, and endurance. He would benefit from continued skilled occupational therapy services to maximize ADL performance and return home safely and independently.  -     Row Name 03/11/22 1257          Therapy Assessment/Plan (OT)    Patient/Family Therapy Goal Statement (OT) To d/c home this afternoon  -     Rehab Potential (OT) fair, will monitor progress closely  -     Criteria for Skilled Therapeutic Interventions Met (OT) yes;meets criteria;skilled treatment is necessary  -     Therapy Frequency (OT) 5 times/wk  -     Row Name 03/11/22 1257          Therapy Plan Review/Discharge Plan (OT)    Equipment Needs Upon Discharge (OT) walker, rolling;commode chair  -     Anticipated Discharge Disposition (OT) sub acute care setting;home with home health;home with assist  -     Row Name 03/11/22 1257          Vital Signs    O2 Delivery Pre Treatment room air  -LF     O2 Delivery Intra Treatment room air  -LF     O2 Delivery Post Treatment room air  -           User Key  (r) = Recorded By, (t) = Taken By, (c) = Cosigned By    Initials Name Provider Type     Gracy Root OT Occupational Therapist               Outcome Measures     Row Name 03/11/22 1259          How much help from another is currently needed...     Putting on and taking off regular lower body clothing? 2  -LF     Bathing (including washing, rinsing, and drying) 2  -LF     Toileting (which includes using toilet bed pan or urinal) 2  -LF     Putting on and taking off regular upper body clothing 3  -LF     Taking care of personal grooming (such as brushing teeth) 3  -LF     Eating meals 4  -LF     AM-PAC 6 Clicks Score (OT) 16  -LF     Row Name 03/11/22 1100          How much help from another person do you currently need...    Turning from your back to your side while in flat bed without using bedrails? 4  -AV     Moving from lying on back to sitting on the side of a flat bed without bedrails? 4  -AV     Moving to and from a bed to a chair (including a wheelchair)? 3  -AV     Standing up from a chair using your arms (e.g., wheelchair, bedside chair)? 3  -AV     Climbing 3-5 steps with a railing? 2  -AV     To walk in hospital room? 2  -AV     AM-PAC 6 Clicks Score (PT) 18  -AV     Row Name 03/11/22 1259 03/11/22 1100       Functional Assessment    Outcome Measure Options AM-PAC 6 Clicks Daily Activity (OT);Optimal Instrument  -LF AM-PAC 6 Clicks Basic Mobility (PT)  -AV    Row Name 03/11/22 1259          Optimal Instrument    Optimal Instrument Optimal - 3  -LF     Bending/Stooping 3  -LF     Standing 2  -LF     Reaching 2  -LF     From the list, choose the 3 activities you would most like to be able to do without any difficulty Bending/stooping;Standing;Reaching  -LF     Total Score Optimal - 3 7  -LF           User Key  (r) = Recorded By, (t) = Taken By, (c) = Cosigned By    Initials Name Provider Type    LF Gracy Root, OT Occupational Therapist    AV Basil Raymond, PT Physical Therapist                Occupational Therapy Education                 Title: PT OT SLP Therapies (In Progress)     Topic: Occupational Therapy (In Progress)     Point: ADL training (In Progress)     Description:   Instruct learner(s) on proper safety adaptation and  remediation techniques during self care or transfers.   Instruct in proper use of assistive devices.              Learning Progress Summary           Patient Acceptance, E,TB, NR by  at 3/11/2022 1259                   Point: Home exercise program (Not Started)     Description:   Instruct learner(s) on appropriate technique for monitoring, assisting and/or progressing therapeutic exercises/activities.              Learner Progress:  Not documented in this visit.          Point: Precautions (In Progress)     Description:   Instruct learner(s) on prescribed precautions during self-care and functional transfers.              Learning Progress Summary           Patient Acceptance, E,TB, NR by  at 3/11/2022 1259                   Point: Body mechanics (In Progress)     Description:   Instruct learner(s) on proper positioning and spine alignment during self-care, functional mobility activities and/or exercises.              Learning Progress Summary           Patient Acceptance, E,TB, NR by  at 3/11/2022 1259                               User Key     Initials Effective Dates Name Provider Type Discipline     06/16/21 -  Gracy Root OT Occupational Therapist OT              OT Recommendation and Plan  Planned Therapy Interventions (OT): activity tolerance training, patient/caregiver education/training, BADL retraining, functional balance retraining, occupation/activity based interventions, transfer/mobility retraining, ROM/therapeutic exercise, strengthening exercise  Therapy Frequency (OT): 5 times/wk  Plan of Care Review  Plan of Care Reviewed With: patient  Progress: no change  Outcome Evaluation: Patient presents with limitations in self-care, functional transfers, balance, BUE strength, and endurance. He would benefit from continued skilled occupational therapy services to maximize ADL performance and return home safely and independently.     Time Calculation:    Time Calculation- OT     Row Name  03/11/22 1304             Time Calculation- OT    OT Received On 03/11/22  -LF      OT Goal Re-Cert Due Date 03/20/22  -LF              Untimed Charges    OT Eval/Re-eval Minutes 35  -LF              Total Minutes    Untimed Charges Total Minutes 35  -LF       Total Minutes 35  -LF            User Key  (r) = Recorded By, (t) = Taken By, (c) = Cosigned By    Initials Name Provider Type     Gracy Root OT Occupational Therapist              Therapy Charges for Today     Code Description Service Date Service Provider Modifiers Qty    [id number] HC OT EVAL MOD COMPLEXITY 3 3/11/2022 Gracy Root OT GO 1               Gracy Root OT  3/11/2022

## 2022-03-11 NOTE — PLAN OF CARE
Goal Outcome Evaluation:  Plan of Care Reviewed With: patient, sibling        Progress: no change  Outcome Evaluation: Patient presents with deficits in balance, transfers, and ambulation. Patient will benefit from skilled PT services to address these mobility deficits and decrease risk of falls.

## 2022-03-11 NOTE — PROGRESS NOTES
[facility]     Progress Note    Patient Name: Ramesh Mccann  : 1960  MRN: [id number]  Primary Care Physician:  Provider, No Known  Date of admission: 3/9/2022    Subjective   Subjective     HPI:  Patient Reports he wants to go home have a discussion with his family and smoke.  No new issues.  Seems to be tolerating liquids well.      Objective   Objective     Vitals:   Temp:  [97.4 °F (36.3 °C)-98.3 °F (36.8 °C)] 97.4 °F (36.3 °C)  Heart Rate:  [] 91  Resp:  [14-20] 18  BP: (106-120)/(48-72) 115/58    Physical Exam  Constitutional:       Appearance: Normal appearance.   Cardiovascular:      Rate and Rhythm: Normal rate.   Pulmonary:      Effort: Pulmonary effort is normal.         Result Review    Result Review:  I have personally reviewed the results from the time of this admission to 3/11/2022 14:25 EST and agree with these findings:  []  Laboratory  []  Microbiology  []  Radiology  []  EKG/Telemetry   []  Cardiology/Vascular   []  Pathology  []  Old records  []  Other:  Assessment/Plan   Assessment / Plan     Brief Patient Summary:  Ramesh Mccann is a 61 y.o. male who has likely diffuse metastatic cancer    Active Hospital Problems:  Active Hospital Problems    Diagnosis    • Severe malnutrition (CMS/HCC)    • Abdominal mass      Plan:  Patient plan for discharge today  Can contact me as an outpatient should he need any palliative interventions       DVT prophylaxis:  Mechanical DVT prophylaxis orders are present.    CODE STATUS:   Code Status (Patient has no pulse and is not breathing): CPR (Attempt to Resuscitate)  Medical Interventions (Patient has pulse or is breathing): Full Support    Disposition:  I expect patient to be discharged today.    Electronically signed by Livan Snell MD, 22, 2:25 PM EST.

## 2022-03-11 NOTE — NURSING NOTE
Patient is discharging home today.  Palliative care called Carmen, patient's sister to determine if they wanted assistance completing the living will and if there were any other needs.      She verbalized that they were good, they plan to have a family meeting tonight with all his kids and work on completing the living will.  She also stated they were going to make decisions about what care they wanted for him.  She stated they have all the information provided to them by [facility]iative care RN and will reach out to [facility] themselves, if they decide to take that route.   Emotional support was provided.      Palliative care contact numbers were provided [phone] or [phone].     CATHERINE PachecoN, RN, CHPN

## 2022-03-11 NOTE — CONSULTS
[facility]   Consult Note    Patient Name: Ramesh Mccann  : 1960  MRN: [id number]  Primary Care Physician:  Provider, No Known  Date of admission: 3/9/2022    Subjective   Subjective     Reason for Consult: Metastatic carcinoma most likely pancreatic primary    HPI: Patient has been admitted to the hospital with extreme weakness was seen in the emergency room and was found to have multiple metastases to the liver he also has.  Ascites and has the fluid drawn out cytology still pending patient's family is known to me from many years when I took care of his mother patient has lost a lot of weight looks cachectic according to his sister he has gained some and he is able to eat and swallow specially the liquids his major problem is lack of appetite    Ramesh Mccann is a 61 y.o. male admitted with large mass most likely pancreatic cancer with metastasis to the liver    Review of Systems   All systems were reviewed and negative except for: Reviewed    Personal History     Past Medical History:   Diagnosis Date   • COPD (chronic obstructive pulmonary disease) (HCC)    • Hypertension    • Stroke (HCC)        History reviewed. No pertinent surgical history.    Family History: family history is not on file. Otherwise pertinent FHx was reviewed and not pertinent to current issue.    Social History:  reports that he has been smoking cigarettes. He has been smoking about 1.00 pack per day. He does not have any smokeless tobacco history on file. He reports previous alcohol use. He reports that he does not use drugs.    Home Medications:  Multiple Vitamins-Minerals, acetaminophen, amLODIPine, atorvastatin, lisinopril, and pregabalin      Allergies:  Allergies   Allergen Reactions   • Penicillins Hives       Objective   Objective     Vitals:   Temp:  [97.4 °F (36.3 °C)-98.3 °F (36.8 °C)] 97.4 °F (36.3 °C)  Heart Rate:  [] 91  Resp:  [14-20] 18  BP: (106-120)/(48-72) 115/58  Physical Exam    Constitutional: Awake,  alert   Eyes: PERRLA, sclerae anicteric, no conjunctival injection   HENT: NCAT, mucous membranes moist   Neck: Supple, no thyromegaly, no lymphadenopathy, trachea midline   Respiratory: Clear to auscultation bilaterally, nonlabored respirations    Cardiovascular: RRR, no murmurs, rubs, or gallops, palpable pedal pulses bilaterally   Gastrointestinal: Positive bowel sounds, soft, nontender, nondistended   Musculoskeletal: No bilateral ankle edema, no clubbing or cyanosis to extremities   Psychiatric: Appropriate affect, cooperative   Neurologic: Oriented x 3, strength symmetric in all extremities, Cranial Nerves grossly intact to confrontation, speech clear   Skin: No rashes     Result Review    Result Review:  I have personally reviewed the results from the time of this admission to 3/11/2022 12:20 EST and agree with these findings:  []  Laboratory  []  Microbiology  []  Radiology  []  EKG/Telemetry   []  Cardiology/Vascular   []  Pathology  []  Old records  []  Other:  Most notable findings include: Most likely metastatic pancreatic cancer    Assessment/Plan   Assessment / Plan     Brief Patient Summary:  Ramesh Mccann is a 61 y.o. male who act cachectic has lost a lot of weight lot because of lack of appetite he is able to eat has BMs as well    Active Hospital Problems:  Active Hospital Problems    Diagnosis    • Abdominal mass        Plan:   Patient's family is going to be coming tonight and then he will decide once he wants to do at this point he states that he would like to give it a trial to chemotherapy only chance for him is to get some chemotherapy which I told him has some responses but the prognosis is very poor is not curable if he does want to try it we could get him a chemotherapy and see if he can tolerate and at this point he wants to try that if that is the case we will get a Port-A-Cath inserted possibility of doing nothing and palliative care has been explained to the patient and his  family        Electronically signed by James Workman MD, 03/11/22, 12:20 PM EST.

## 2022-03-11 NOTE — THERAPY EVALUATION
Acute Care - Physical Therapy Initial Evaluation   [facility]     Patient Name: Ramesh Mccann  : 1960  MRN: [id number]  Today's Date: 3/11/2022      Visit Dx:     ICD-10-CM ICD-9-CM   1. Abdominal mass, unspecified abdominal location  R19.00 789.30   2. Difficulty walking  R26.2 719.7     Patient Active Problem List   Diagnosis   • Abdominal mass     Past Medical History:   Diagnosis Date   • COPD (chronic obstructive pulmonary disease) (HCC)    • Hypertension    • Stroke (HCC)      History reviewed. No pertinent surgical history.  PT Assessment (last 12 hours)     PT Evaluation and Treatment     Row Name 22 1000          Physical Therapy Time and Intention    Document Type evaluation  -AV     Mode of Treatment individual therapy;physical therapy  -AV     Row Name 22 1000          General Information    Patient Profile Reviewed yes  -AV     Prior Level of Function --  Typically independent until recently. Has STC but does not use it. Ambulated by reaching out for walls/furniture to steady self. No home O2.  -AV     Equipment Currently Used at Home cane, straight  -AV     Existing Precautions/Restrictions fall  -AV     Row Name 22 1000          Living Environment    Home Accessibility stairs within home;stairs to enter home  -AV     People in Home sibling(s)  -AV     Row Name 22 1000          Home Main Entrance    Number of Stairs, Main Entrance three  -AV     Row Name 22 1000          Stairs Within Home, Primary    Stairs, Within Home, Primary Flight to basement where patient resides. Walkout basement so patient is able to enter home without maneuver stairs.  -AV     Row Name 22 1000          Range of Motion (ROM)    Range of Motion bilateral lower extremities;ROM is WFL  -AV     Row Name 22 1000          Strength (Manual Muscle Testing)    Strength (Manual Muscle Testing) bilateral lower extremities;strength is WFL  -AV     Row Name 22 1000          Bed Mobility     Bed Mobility supine-sit  -AV     Supine-Sit Carver (Bed Mobility) supervision  -AV     Row Name 03/11/22 1000          Transfers    Transfers sit-stand transfer;stand pivot/stand step transfer  -AV     Sit-Stand Carver (Transfers) contact guard;minimum assist (75% patient effort)  -AV     Row Name 03/11/22 1000          Sit-Stand Transfer    Assistive Device (Sit-Stand Transfers) --  HHA  -AV     [facility] Name 03/11/22 1000          Stand Pivot/Stand Step Transfer    Stand Pivot/Stand Step Carver (Transfers) contact guard  -AV     Assistive Device (Stand Pivot Stand Step Transfer) --  HHA  -AV     Comment, (Stand Pivot Transfer) Patient demonstrated difficulty maneuvering BLE during SPS transfer from bed to recliner.  [facility] Name 03/11/22 1000          Gait/Stairs (Locomotion)    Comment, (Gait/Stairs) Patient declined ambulating in the room.  [facility] [place] Name 03/11/22 1000          Safety Issues, Functional Mobility    Impairments Affecting Function (Mobility) balance;endurance/activity tolerance  -[facility] [place] Name 03/11/22 1000          Balance    Balance Assessment standing dynamic balance  -AV     Dynamic Standing Balance minimal assist  -AV     Position/Device Used, Standing Balance supported  -[facility] [place] Name 03/11/22 1000          Plan of Care Review    Plan of Care Reviewed With patient;sibling  -AV     Progress no change  -AV     Outcome Evaluation Patient presents with deficits in balance, transfers, and ambulation. Patient will benefit from skilled PT services to address these mobility deficits and decrease risk of falls.  -     Row Name 03/11/22 1000          Therapy Assessment/Plan (PT)    Rehab Potential (PT) good, to achieve stated therapy goals  -AV     Criteria for Skilled Interventions Met (PT) yes;meets criteria  -AV     Problem List (PT) problems related to;balance;mobility  -AV     Activity Limitations Related to Problem List (PT) unable to transfer safely;unable to  ambulate safely  -AV     Row Name 03/11/22 1000          PT Evaluation Complexity    History, PT Evaluation Complexity 1-2 personal factors and/or comorbidities  -AV     Examination of Body Systems (PT Eval Complexity) total of 4 or more elements  -AV     Clinical Presentation (PT Evaluation Complexity) stable  -AV     Clinical Decision Making (PT Evaluation Complexity) low complexity  -AV     Overall Complexity (PT Evaluation Complexity) low complexity  -AV     Row Name 03/11/22 1000          Therapy Plan Review/Discharge Plan (PT)    Therapy Plan Review (PT) evaluation/treatment results reviewed;patient;sibling  -AV     Row Name 03/11/22 1000          Physical Therapy Goals    Transfer Goal Selection (PT) transfer, PT goal 1  -AV     Gait Training Goal Selection (PT) gait training, PT goal 1  -AV     Row Name 03/11/22 1000          Transfer Goal 1 (PT)    Activity/Assistive Device (Transfer Goal 1, PT) transfers, all;walker, rolling  -AV     Gibson Level/Cues Needed (Transfer Goal 1, PT) modified independence  -AV     Time Frame (Transfer Goal 1, PT) 10 days  -AV     Row Name 03/11/22 1000          Gait Training Goal 1 (PT)    Activity/Assistive Device (Gait Training Goal 1, PT) gait (walking locomotion);assistive device use;walker, rolling  -AV     Gibson Level (Gait Training Goal 1, PT) modified independence  -AV     Distance (Gait Training Goal 1, PT) 200  -AV     Time Frame (Gait Training Goal 1, PT) 10 days  -AV           User Key  (r) = Recorded By, (t) = Taken By, (c) = Cosigned By    Initials Name Provider Type    AV Basil Raymond, PT Physical Therapist                Physical Therapy Education                 Title: PT OT SLP Therapies (In Progress)     Topic: Physical Therapy (In Progress)     Point: Mobility training (Done)     Learning Progress Summary           Patient Acceptance, E,TB, VU by AV at 3/11/2022 1110                   Point: Home exercise program (Not Started)     Learner  Progress:  Not documented in this visit.          Point: Body mechanics (Done)     Learning Progress Summary           Patient Acceptance, E,TB, VU by AV at 3/11/2022 1110                   Point: Precautions (Done)     Learning Progress Summary           Patient Acceptance, E,TB, VU by AV at 3/11/2022 1110                               User Key     Initials Effective Dates Name Provider Type Discipline    AV 06/11/21 -  Basil Raymond, PT Physical Therapist PT              PT Recommendation and Plan  Anticipated Discharge Disposition (PT): sub acute care setting, home with home health  Planned Therapy Interventions (PT): balance training, bed mobility training, gait training, neuromuscular re-education, strengthening, transfer training  Therapy Frequency (PT): daily  Plan of Care Reviewed With: patient, sibling  Progress: no change  Outcome Evaluation: Patient presents with deficits in balance, transfers, and ambulation. Patient will benefit from skilled PT services to address these mobility deficits and decrease risk of falls.   Outcome Measures     Row Name 03/11/22 1100             How much help from another person do you currently need...    Turning from your back to your side while in flat bed without using bedrails? 4  -AV      Moving from lying on back to sitting on the side of a flat bed without bedrails? 4  -AV      Moving to and from a bed to a chair (including a wheelchair)? 3  -AV      Standing up from a chair using your arms (e.g., wheelchair, bedside chair)? 3  -AV      Climbing 3-5 steps with a railing? 2  -AV      To walk in hospital room? 2  -AV      AM-PAC 6 Clicks Score (PT) 18  -AV              Functional Assessment    Outcome Measure Options AM-PAC 6 Clicks Basic Mobility (PT)  -AV            User Key  (r) = Recorded By, (t) = Taken By, (c) = Cosigned By    Initials Name Provider Type    AV Basil Raymond, PT Physical Therapist                 Time Calculation:    PT Charges     Row Name  03/11/22 1109             Time Calculation    PT Received On 03/11/22  -AV      PT Goal Re-Cert Due Date 03/20/22  -AV              Untimed Charges    PT Eval/Re-eval Minutes 35  -AV              Total Minutes    Untimed Charges Total Minutes 35  -AV       Total Minutes 35  -AV            User Key  (r) = Recorded By, (t) = Taken By, (c) = Cosigned By    Initials Name Provider Type    AV Basil Raymond, PT Physical Therapist              Therapy Charges for Today     Code Description Service Date Service Provider Modifiers Qty    32491096003 HC PT EVAL LOW COMPLEXITY 3 3/11/2022 Basil Raymond, PT GP 1          PT G-Codes  Outcome Measure Options: AM-PAC 6 Clicks Basic Mobility (PT)  AM-PAC 6 Clicks Score (PT): 18    Basil Raymond, PT  3/11/2022

## 2022-03-11 NOTE — PROGRESS NOTES
joanna    [facility]     Progress Note    Patient Name: Ramesh Mccann  : 1960  MRN: [id number]    Date of admission: 3/9/2022    Subjective   Subjective     Patient going home today to discuss diagnosis and treatment plan with children.       Objective   Objective     Review of Systems:    A 10 point review of systems was performed and all are negative except for what is documented in the HPI.     Vitals:   Temp:  [97.4 °F (36.3 °C)-98.3 °F (36.8 °C)] 97.4 °F (36.3 °C)  Heart Rate:  [] 91  Resp:  [14-20] 18  BP: (106-120)/(48-72) 115/58  Physical Exam    Constitutional: Awake, alert, looks older than stated age, cachectic   Eyes: PERRLA    Neck: Supple, trachea midline   Respiratory: respirations even and unlabored   Cardiovascular: RRR   Gastrointestinal: distended   Psychiatric: Appropriate affect, cooperative   Neurologic: Oriented x 3, speech clear   Skin: No rashes     Result Review    Result Review:  I have personally reviewed the results from the time of this admission to 3/11/2022 13:32 EST and agree with these findings:  []  Laboratory  []  Microbiology  []  Radiology  []  EKG/Telemetry   []  Cardiology/Vascular   []  Pathology  []  Old records  []  Other:      Assessment/Plan   Assessment / Plan     Diagnosis   Abdominal mass  Active Hospital Problems:  Active Hospital Problems    Diagnosis    • Abdominal mass        Plan:      Patients family can call clinic to follow up if patient decides he wants PED tube of pleurex tube       DVT prophylaxis:  Mechanical DVT prophylaxis orders are present.          This document has been electronically signed by CORI Stack  2022 13:32 EST

## 2022-03-11 NOTE — DISCHARGE SUMMARY
[facility]         HOSPITALIST  DISCHARGE SUMMARY    Patient Name: Ramesh Mccann  : 1960  MRN: [id number]    Date of Admission: 3/9/2022  Date of Discharge:  3/11/2022  Primary Care Physician: Provider, No Known    Consults     Date and Time Order Name Status Description    3/11/2022 11:10 AM Inpatient Oncology Consult      3/10/2022 12:55 PM Inpatient General Surgery Consult Completed     3/9/2022 11:53 AM Inpatient Hospitalist Consult            Active and Resolved Hospital Problems:  Weakness  Abdominal pain  Metastatic disease throughout the abdomen, possible pancreatic in nature  History of hypertension  History of hyperlipidemia  History of alcoholism,  COPD not in acute exacerbation  Nicotine dependence    Hospital Course     Hospital Course:  Ramesh Mccann is a 61 y.o. male who presents to the hospital with a 1 month history of worsening weakness, worsening appetite.  Patient states that his sister brought him up from [place] to help care for him as he is progressively getting worse.  They brought the patient to the emergency department today for worsening weakness, inability to walk, abdominal pain.  In the emergency department the patient underwent CT scan of the abdomen which was significant for multiple hepatic masses, pancreatic mass, some small bowel distention, moderate ascites.  Because of the inability to walk and no safe discharge plan the patient was admitted to the hospital for further evaluation and management.  Patient was treated with pain medication and antiemetics.  Patient's nausea and vomiting did improve some.  Patient underwent paracentesis this was negative for SBP.  Cytology is pending on day of discharge.  Patient will follow up with oncology next week, patient to follow-up with general surgery as needed for palliation.  Patient will discuss goals of care with family and decide on what route to go for treatment versus going for comfort.  Patient was discharged  home with his sister today in stable condition    Day of Discharge     Vital Signs:  Temp:  [97.4 °F (36.3 °C)-98.3 °F (36.8 °C)] 97.4 °F (36.3 °C)  Heart Rate:  [] 91  Resp:  [14-20] 18  BP: (106-120)/(48-72) 115/58    Physical Exam:   General appearance: NAD, conversant   Eyes: anicteric sclerae, moist conjunctivae; no lid-lag; PERRLA  HENT: Atraumatic; oropharynx clear with moist mucous membranes and no mucosal ulcerations; normal hard and soft palate  Neck: Trachea midline; FROM, supple, no thyromegaly or lymphadenopathy  Lungs: CTA, with normal respiratory effort and no intercostal retractions  CV: Regular Rate and Rhythm, no Murmurs, Rubs, or Gallops   Abdomen: Soft, distended, diffusely tender; no masses or hepatosplenomegaly  Extremities: No peripheral edema or extremity lymphadenopathy  Skin: Normal temperature, turgor and texture; no rash, ulcers or subcutaneous nodules  Psych: Appropriate affect, alert and oriented to person, place and time  Neuro: CN II - XII intact no motor deficits, no sensory deficits, globally weak    Discharge Details        Discharge Medications      New Medications      Instructions Start Date   famotidine 40 MG tablet  Commonly known as: PEPCID   40 mg, Oral, Daily   Start Date: March 12, 2022     ondansetron ODT 4 MG disintegrating tablet  Commonly known as: Zofran ODT   4 mg, Translingual, Every 8 Hours PRN      oxyCODONE 5 MG immediate release tablet  Commonly known as: Roxicodone   5 mg, Oral, Every 4 Hours PRN      sennosides-docusate 8.6-50 MG per tablet  Commonly known as: PERICOLACE   2 tablets, Oral, Daily         Continue These Medications      Instructions Start Date   amLODIPine 10 MG tablet  Commonly known as: NORVASC   10 mg, Oral, Daily      atorvastatin 10 MG tablet  Commonly known as: LIPITOR   10 mg, Oral, Daily      Lyrica 150 MG capsule  Generic drug: pregabalin   150 mg, Oral, 2 Times Daily      MULTIVITAMIN GUMMIES MENS PO   1 tablet, Oral, Daily          Stop These Medications    acetaminophen 500 MG tablet  Commonly known as: TYLENOL     cefuroxime 500 MG tablet  Commonly known as: CEFTIN     lisinopril 10 MG tablet  Commonly known as: PRINIVIL,ZESTRIL     predniSONE 20 MG tablet  Commonly known as: DELTASONE            Allergies   Allergen Reactions   • Penicillins Hives       Discharge Disposition:  Home or Self Care    Diet:  Hospital:  Diet Order   Procedures   • Diet Clear Liquid       Discharge Activity:       CODE STATUS:  Code Status and Medical Interventions:   Ordered at: 03/09/22 1234     Code Status (Patient has no pulse and is not breathing):    CPR (Attempt to Resuscitate)     Medical Interventions (Patient has pulse or is breathing):    Full Support       No future appointments.    Additional Instructions for the Follow-ups that You Need to Schedule     Discharge Follow-up with PCP   As directed       Currently Documented PCP:    Provider, No Known    PCP Phone Number:    None     Follow Up Details: 3 to 7 days         Discharge Follow-up with Specified Provider: Dr. Garcia oncology   As directed      To: Dr. Garcia oncology    Follow Up Details: Patient to call oncology monday for appointment         Discharge Follow-up with Specified Provider: Alis   As directed      To: Alis    Follow Up Details: Call Dr. Snell if needed for palliation surgery [phone]               Pertinent  and/or Most Recent Results     IMAGING:  CT Abdomen Pelvis With Contrast    Result Date: 3/9/2022  PROCEDURE: CT ABDOMEN PELVIS W CONTRAST  COMPARISON: None  INDICATIONS: GENERALZIED ABDOMEN PAIN WITH SWELLING  TECHNIQUE: CT images were created with non-ionic intravenous contrast material.   PROTOCOL:   Standard imaging protocol performed    RADIATION:   DLP: 813.4mGy*cm   Automated exposure control was utilized to minimize radiation dose. CONTRAST: 100cc Isovue 370 I.V.  FINDINGS:  Sub cm nodule is seen in the posterior right middle lobe on the very  1st image of this study.  This is not adequately characterized.  CT scan of the chest is recommended.  Moderate hiatal hernia is evident.  The liver has a nodular contour suggesting cirrhosis.  Multiple hypodense hepatic masses are suspicious for metastatic disease.  Irregular peripheral enhancement is evident.  The nodular peripheral progressive enhancement typical of hemangioma is not evident.  The largest lesion is seen in the left liver lobe and measures 5 cm in diameter.  About 10 percent of the hepatic volume is involved.  The gallbladder is not abnormally distended.  Multi-cystic mass in the head of the pancreas measures 5 cm by 2.5 cm.  A moderate amount of ascites is evident.  The spleen is of normal size.  The left kidney has a normal appearance.  The right kidney is relatively small, suggesting chronic ischemia.  Moderately dilated loops of small bowel measure up to 3 cm in diameter, with prominent air-fluid levels.  Heterogeneous mass in the right lower quadrant involve small bowel, and measures 10 cm in diameter.  CONTINUED ON NEXT PAGE...    No abnormality of the colon is evident.  The urinary bladder is not abnormally distended.  Multiple old right rib fractures are evident.  Moderate degenerative changes seen in the lower thoracic and lumbar spine.        CT scan of the abdomen and pelvis with IV contrast demonstrating nodular hepatic contour suggesting cirrhosis.  Multiple hypodense hepatic masses with a regular peripheral enhancement are consistent with metastases.  5 cm multi cystic mass is seen in the head of the pancreas.  Moderately dilated loops of small bowel with prominent air-fluid levels may represent partial or incomplete small bowel obstruction.  10 cm heterogeneous mass in the right lower quadrant involves small bowel.     NAVEEN MOHAN MD       Electronically Signed and Approved By: NAVEEN MOHAN MD on 3/09/2022 at 11:08             IR Paracentesis With Imaging    Result Date:  3/10/2022  PROCEDURE: IR PARACENTESIS WITH IMAGING  COMPARISON: None  INDICATIONS: Ascites, new.5 FR 15 CM PIGTAIL CATHETER. 5.5 L OF CLEAR FARR FLUID REMOVED. 200 ML COLLECTED AND SENT TO LAB.  FINDINGS:  After the details of the procedure were explained to the patient informed consent was obtained.  The patient's right abdomen was evaluated and a collection of fluid was localized.  The skin was then marked.  The area marked was prepped and draped in the usual sterile fashion using maximum sterile barrier techniques.  1 percent lidocaine was administered for local anesthetic.  A small incision was made with a 11. Scalpel blade.  Through the incision, a 5 [place] pigtail State catheter was placed into the peritoneal space.  There was return of 5.5 L of farr ascites.  The catheter was removed and a sterile bandage was applied.  The patient tolerated the procedure without complication.       Technically successful ultrasound-guided paracentesis.      SOUTH MCCOLLUM MD       Electronically Signed and Approved By: SOUTH MCCOLLUM MD on 3/10/2022 at 14:50               LAB RESULTS:      Lab 03/11/22  0527 03/10/22  1332 03/09/22  1756 03/09/22 0917   WBC 10.69 10.99*  --  17.72*   HEMOGLOBIN 11.7* 11.0*  --  12.6*   HEMATOCRIT 37.4* 34.5*  --  38.1   PLATELETS 256 271  --  419   NEUTROS ABS 8.80* 9.24*  --  14.12*   IMMATURE GRANS (ABS) 0.06* 0.04  --  0.08*   LYMPHS ABS 0.99 0.97  --  1.64   MONOS ABS 0.81 0.74  --  1.85*   EOS ABS 0.02 0.00  --  0.01   MCV 88.6 88.7  --  86.8   PROTIME  --   --  10.6  --          Lab 03/11/22  0527 03/10/22  0951 03/09/22  0917   SODIUM 131* 132* 133*   POTASSIUM 3.9 4.2 4.5   CHLORIDE 94* 92* 91*   CO2 28.2 29.7* 28.3   ANION GAP 8.8 10.3 13.7   BUN 19 25* 26*   CREATININE 0.93 0.83 1.00   EGFR 92.8 99.0 85.1   GLUCOSE 125* 145* 161*   CALCIUM 9.2 9.6 9.8   MAGNESIUM 2.0 2.1  --    PHOSPHORUS 2.8 2.4*  --          Lab 03/11/22  0527 03/10/22  0951 03/09/22  0917   TOTAL PROTEIN 6.2 6.7  6.9   ALBUMIN 2.90* 3.30* 3.40*   GLOBULIN 3.3 3.4 3.5   ALT (SGPT) 14 16 23   AST (SGOT) 14 15 19   BILIRUBIN 0.3 0.3 0.4   ALK PHOS 96 108 126*   LIPASE  --   --  47         Lab 03/09/22  1756   PROTIME 10.6   INR 1.01*                 Brief Urine Lab Results  (Last result in the past 365 days)      Color   Clarity   Blood   Leuk Est   Nitrite   Protein   CREAT   Urine HCG        03/09/22 1131 Yellow   Clear   Negative   Negative   Negative   Trace               Microbiology Results (last 10 days)     Procedure Component Value - Date/Time    Body Fluid Culture - Body Fluid, Peritoneum [[id number]] Collected: 03/10/22 1140    Lab Status: Preliminary result Specimen: Body Fluid from Peritoneum Updated: 03/11/22 1106     Body Fluid Culture No growth     Gram Stain Few (2+) WBCs seen      No organisms seen                Time spent on Discharge including face to face service: Greater than 30 minutes      Electronically signed by Rick Patel MD, 03/11/22, 1:54 PM EST.

## 2022-03-11 NOTE — PLAN OF CARE
Goal Outcome Evaluation:  Plan of Care Reviewed With: patient        Progress: no change  Outcome Evaluation: Patient presents with limitations in self-care, functional transfers, balance, BUE strength, and endurance. He would benefit from continued skilled occupational therapy services to maximize ADL performance and return home safely and independently.

## 2022-03-12 ENCOUNTER — READMISSION MANAGEMENT (OUTPATIENT)
Dept: CALL CENTER | Facility: HOSPITAL | Age: 62
End: 2022-03-12

## 2022-03-12 NOTE — OUTREACH NOTE
Prep Survey    Flowsheet Row Responses   [facility] facility patient discharged from? [facility]   Is LACE score < 7 ? Yes   Emergency Room discharge w/ pulse ox? No   Eligibility Not Eligible   What are the reasons patient is not eligible? Other  [Low risk for readmission]   Does the patient have one of the following disease processes/diagnoses(primary or secondary)? Other   Prep survey completed? Yes          JEANINE KLEIN - Registered Nurse

## 2022-03-13 LAB
BACTERIA FLD CULT: NORMAL
GRAM STN SPEC: NORMAL
GRAM STN SPEC: NORMAL

## 2022-03-14 LAB
CYTO UR: NORMAL
LAB AP CASE REPORT: NORMAL
LAB AP CLINICAL INFORMATION: NORMAL
PATH REPORT.FINAL DX SPEC: NORMAL
PATH REPORT.GROSS SPEC: NORMAL
STAT OF ADQ CVX/VAG CYTO-IMP: NORMAL

## 2022-03-15 LAB — BACTERIA SPEC ANAEROBE CULT: NORMAL

## 2022-03-17 LAB — BILIRUB FLD-MCNC: 0.3 MG/DL
